# Patient Record
Sex: FEMALE | Race: WHITE
[De-identification: names, ages, dates, MRNs, and addresses within clinical notes are randomized per-mention and may not be internally consistent; named-entity substitution may affect disease eponyms.]

---

## 2018-03-28 ENCOUNTER — HOSPITAL ENCOUNTER (OUTPATIENT)
Age: 68
End: 2018-03-28
Payer: MEDICARE

## 2018-03-28 DIAGNOSIS — I65.21: Primary | ICD-10-CM

## 2018-03-28 PROCEDURE — 93882 EXTRACRANIAL UNI/LTD STUDY: CPT

## 2023-04-27 LAB
ALANINE AMINOTRANSFERASE (SGPT) (U/L) IN SER/PLAS: 27 U/L (ref 7–45)
ALBUMIN (G/DL) IN SER/PLAS: 4.4 G/DL (ref 3.4–5)
ALKALINE PHOSPHATASE (U/L) IN SER/PLAS: 110 U/L (ref 33–136)
ANION GAP IN SER/PLAS: 13 MMOL/L (ref 10–20)
ASPARTATE AMINOTRANSFERASE (SGOT) (U/L) IN SER/PLAS: 16 U/L (ref 9–39)
BASOPHILS (10*3/UL) IN BLOOD BY AUTOMATED COUNT: 0.02 X10E9/L (ref 0–0.1)
BASOPHILS/100 LEUKOCYTES IN BLOOD BY AUTOMATED COUNT: 0.2 % (ref 0–2)
BILIRUBIN TOTAL (MG/DL) IN SER/PLAS: 0.4 MG/DL (ref 0–1.2)
C REACTIVE PROTEIN (MG/L) IN SER/PLAS: 0.55 MG/DL
CALCIUM (MG/DL) IN SER/PLAS: 10.2 MG/DL (ref 8.6–10.6)
CARBON DIOXIDE, TOTAL (MMOL/L) IN SER/PLAS: 25 MMOL/L (ref 21–32)
CHLORIDE (MMOL/L) IN SER/PLAS: 108 MMOL/L (ref 98–107)
CREATININE (MG/DL) IN SER/PLAS: 0.68 MG/DL (ref 0.5–1.05)
EOSINOPHILS (10*3/UL) IN BLOOD BY AUTOMATED COUNT: 0 X10E9/L (ref 0–0.7)
EOSINOPHILS/100 LEUKOCYTES IN BLOOD BY AUTOMATED COUNT: 0 % (ref 0–6)
ERYTHROCYTE DISTRIBUTION WIDTH (RATIO) BY AUTOMATED COUNT: 14.3 % (ref 11.5–14.5)
ERYTHROCYTE MEAN CORPUSCULAR HEMOGLOBIN CONCENTRATION (G/DL) BY AUTOMATED: 32.4 G/DL (ref 32–36)
ERYTHROCYTE MEAN CORPUSCULAR VOLUME (FL) BY AUTOMATED COUNT: 89 FL (ref 80–100)
ERYTHROCYTES (10*6/UL) IN BLOOD BY AUTOMATED COUNT: 4.25 X10E12/L (ref 4–5.2)
GFR FEMALE: >90 ML/MIN/1.73M2
GLUCOSE (MG/DL) IN SER/PLAS: 179 MG/DL (ref 74–99)
HEMATOCRIT (%) IN BLOOD BY AUTOMATED COUNT: 38 % (ref 36–46)
HEMOGLOBIN (G/DL) IN BLOOD: 12.3 G/DL (ref 12–16)
IMMATURE GRANULOCYTES/100 LEUKOCYTES IN BLOOD BY AUTOMATED COUNT: 0.7 % (ref 0–0.9)
LEUKOCYTES (10*3/UL) IN BLOOD BY AUTOMATED COUNT: 12.9 X10E9/L (ref 4.4–11.3)
LYMPHOCYTES (10*3/UL) IN BLOOD BY AUTOMATED COUNT: 1.09 X10E9/L (ref 1.2–4.8)
LYMPHOCYTES/100 LEUKOCYTES IN BLOOD BY AUTOMATED COUNT: 8.4 % (ref 13–44)
MONOCYTES (10*3/UL) IN BLOOD BY AUTOMATED COUNT: 0.95 X10E9/L (ref 0.1–1)
MONOCYTES/100 LEUKOCYTES IN BLOOD BY AUTOMATED COUNT: 7.4 % (ref 2–10)
NEUTROPHILS (10*3/UL) IN BLOOD BY AUTOMATED COUNT: 10.77 X10E9/L (ref 1.2–7.7)
NEUTROPHILS/100 LEUKOCYTES IN BLOOD BY AUTOMATED COUNT: 83.3 % (ref 40–80)
NRBC (PER 100 WBCS) BY AUTOMATED COUNT: 0 /100 WBC (ref 0–0)
PLATELETS (10*3/UL) IN BLOOD AUTOMATED COUNT: 358 X10E9/L (ref 150–450)
POTASSIUM (MMOL/L) IN SER/PLAS: 4 MMOL/L (ref 3.5–5.3)
PROTEIN TOTAL: 7.3 G/DL (ref 6.4–8.2)
SEDIMENTATION RATE, ERYTHROCYTE: 33 MM/H (ref 0–30)
SODIUM (MMOL/L) IN SER/PLAS: 142 MMOL/L (ref 136–145)
UREA NITROGEN (MG/DL) IN SER/PLAS: 17 MG/DL (ref 6–23)

## 2023-10-22 PROBLEM — D84.821 IMMUNOSUPPRESSION DUE TO DRUG THERAPY (MULTI): Status: ACTIVE | Noted: 2023-10-22

## 2023-10-22 PROBLEM — E87.6 HYPOKALEMIA: Status: ACTIVE | Noted: 2023-10-22

## 2023-10-22 PROBLEM — Z79.899 IMMUNOSUPPRESSION DUE TO DRUG THERAPY (MULTI): Status: ACTIVE | Noted: 2023-10-22

## 2023-10-22 PROBLEM — E66.09 CLASS 2 OBESITY DUE TO EXCESS CALORIES WITH BODY MASS INDEX (BMI) OF 37.0 TO 37.9 IN ADULT: Status: ACTIVE | Noted: 2023-10-22

## 2023-10-22 PROBLEM — M48.061 LUMBAR SPINAL STENOSIS: Status: ACTIVE | Noted: 2023-10-22

## 2023-10-22 PROBLEM — M17.9 OSTEOARTHRITIS OF KNEE: Status: ACTIVE | Noted: 2023-10-22

## 2023-10-22 PROBLEM — I10 BENIGN ESSENTIAL HYPERTENSION: Status: ACTIVE | Noted: 2023-10-22

## 2023-10-22 PROBLEM — S63.599S: Status: ACTIVE | Noted: 2023-10-22

## 2023-10-22 PROBLEM — E78.2 MIXED HYPERLIPIDEMIA: Status: ACTIVE | Noted: 2023-10-22

## 2023-10-22 PROBLEM — L40.59 POLYARTICULAR PSORIATIC ARTHRITIS (MULTI): Status: ACTIVE | Noted: 2023-10-22

## 2023-10-22 PROBLEM — E66.812 CLASS 2 OBESITY DUE TO EXCESS CALORIES WITH BODY MASS INDEX (BMI) OF 37.0 TO 37.9 IN ADULT: Status: ACTIVE | Noted: 2023-10-22

## 2023-10-22 PROBLEM — E55.9 VITAMIN D DEFICIENCY: Status: ACTIVE | Noted: 2023-10-22

## 2023-10-22 PROBLEM — R91.1 PULMONARY NODULE: Status: ACTIVE | Noted: 2023-10-22

## 2023-10-22 PROBLEM — E11.9 TYPE 2 DIABETES MELLITUS, WITHOUT LONG-TERM CURRENT USE OF INSULIN (MULTI): Status: ACTIVE | Noted: 2023-10-22

## 2023-10-22 RX ORDER — BUDESONIDE 0.5 MG/2ML
0.5 INHALANT ORAL 2 TIMES DAILY
COMMUNITY

## 2023-10-22 RX ORDER — ACETAMINOPHEN 325 MG/1
325 TABLET ORAL EVERY 6 HOURS PRN
COMMUNITY

## 2023-10-22 RX ORDER — GABAPENTIN 100 MG/1
100 CAPSULE ORAL NIGHTLY
COMMUNITY
End: 2024-05-01 | Stop reason: WASHOUT

## 2023-10-22 RX ORDER — LOSARTAN POTASSIUM AND HYDROCHLOROTHIAZIDE 12.5; 5 MG/1; MG/1
TABLET ORAL
COMMUNITY
Start: 2018-10-15 | End: 2023-10-25

## 2023-10-22 RX ORDER — FLUOCINOLONE ACETONIDE 0.11 MG/ML
OIL TOPICAL 3 TIMES DAILY
COMMUNITY

## 2023-10-22 RX ORDER — POTASSIUM CHLORIDE 750 MG/1
1 TABLET, FILM COATED, EXTENDED RELEASE ORAL DAILY
COMMUNITY
Start: 2022-04-14

## 2023-10-22 RX ORDER — ALBUTEROL SULFATE 0.83 MG/ML
2.5 SOLUTION RESPIRATORY (INHALATION)
COMMUNITY

## 2023-10-22 RX ORDER — CHOLECALCIFEROL (VITAMIN D3) 1250 MCG
TABLET ORAL
COMMUNITY
Start: 2020-07-22

## 2023-10-22 RX ORDER — BACLOFEN 10 MG/1
TABLET ORAL 3 TIMES DAILY
COMMUNITY

## 2023-10-22 RX ORDER — PANTOPRAZOLE SODIUM 20 MG/1
1 TABLET, DELAYED RELEASE ORAL 2 TIMES DAILY
COMMUNITY
Start: 2020-07-22 | End: 2024-05-01 | Stop reason: WASHOUT

## 2023-10-22 RX ORDER — FLUTICASONE PROPIONATE 50 MCG
1 SPRAY, SUSPENSION (ML) NASAL DAILY
COMMUNITY

## 2023-10-22 RX ORDER — MINERAL OIL
180 ENEMA (ML) RECTAL DAILY
COMMUNITY
End: 2023-10-25 | Stop reason: ALTCHOICE

## 2023-10-22 RX ORDER — MELOXICAM 15 MG/1
TABLET ORAL
COMMUNITY
Start: 2020-07-22

## 2023-10-22 RX ORDER — NIFEDIPINE 20 MG/1
20 CAPSULE ORAL 3 TIMES DAILY
COMMUNITY
End: 2023-10-22 | Stop reason: ENTERED-IN-ERROR

## 2023-10-22 RX ORDER — APREMILAST 30 MG/1
1 TABLET, FILM COATED ORAL 2 TIMES DAILY
COMMUNITY
Start: 2020-07-22

## 2023-10-22 RX ORDER — SPIRONOLACTONE 25 MG/1
TABLET ORAL DAILY
COMMUNITY
End: 2024-05-01 | Stop reason: WASHOUT

## 2023-10-22 RX ORDER — SULFASALAZINE 500 MG/1
2 TABLET ORAL 2 TIMES DAILY
COMMUNITY
Start: 2020-07-22 | End: 2023-11-02 | Stop reason: SDDI

## 2023-10-22 RX ORDER — VITAMIN E
CREAM (GRAM) TOPICAL
COMMUNITY
Start: 2018-10-15

## 2023-10-22 RX ORDER — ALBUTEROL SULFATE 90 UG/1
2 AEROSOL, METERED RESPIRATORY (INHALATION) EVERY 4 HOURS PRN
COMMUNITY
Start: 2018-10-15

## 2023-10-22 RX ORDER — MONTELUKAST SODIUM 10 MG/1
1 TABLET ORAL DAILY
COMMUNITY
Start: 2021-12-29 | End: 2024-05-01 | Stop reason: WASHOUT

## 2023-10-22 RX ORDER — NIFEDIPINE 30 MG/1
30 TABLET, FILM COATED, EXTENDED RELEASE ORAL
COMMUNITY
End: 2024-05-01 | Stop reason: WASHOUT

## 2023-10-22 RX ORDER — NYSTATIN 100000 [USP'U]/G
POWDER TOPICAL
COMMUNITY
Start: 2018-10-15

## 2023-10-22 RX ORDER — CLOBETASOL PROPIONATE 0.5 MG/G
OINTMENT TOPICAL 2 TIMES DAILY
COMMUNITY

## 2023-10-22 RX ORDER — EMOLLIENT COMBINATION NO.53
CREAM (GRAM) TOPICAL
COMMUNITY
Start: 2018-10-15 | End: 2023-10-25

## 2023-10-22 RX ORDER — IBUPROFEN 200 MG
TABLET ORAL EVERY 6 HOURS PRN
COMMUNITY

## 2023-10-22 NOTE — PROGRESS NOTES
"Subjective   Patient ID: Dianna Hernandez is a 73 y.o. female who presents for Arthritis.  HPI  Since last seen, pt reports she had a rough summer.  Currently getting evaluated for episodes of syncope.   Plans to see electrophysiologist and have tilt table test.   Has been having very high blood pressures and is worried.  Has  a call in to her cardiologist for more direction   Having dental issues and just had an extraction last week  Has been having a lot of upper back pain.  Had xray and it showed arthritis in neck and upper back  Part of evaluation of syncope led to more neuro evaluation.  MRI last year showed microvascular changes.  EEG now shows \"electrical dysfunction in frontal and temporal lobe\"  Pt scheduled to see neuro for a brain health evaluation  Otezla held for 2 weeks for nausea.   Arthritis flared  Now back on it.  Hard to tell if patient has been consistent with sulfasalazine use  Patient Active Problem List    Diagnosis Date Noted    Fibromyalgia 10/25/2023    Psoriasis 10/25/2023    Benign essential hypertension 10/22/2023    Hypokalemia 10/22/2023    Lumbar spinal stenosis 10/22/2023    Mixed hyperlipidemia 10/22/2023    Tear of triangular fibrocartilage complex (TFCC), sequela 10/22/2023    Polyarticular psoriatic arthritis (CMS/HCC) 10/22/2023    Vitamin D deficiency 10/22/2023    Osteoarthritis of knee 10/22/2023    Class 2 obesity due to excess calories with body mass index (BMI) of 35.0 to 35.9 in adult 10/22/2023    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) 10/22/2023    Type 2 diabetes mellitus, without long-term current use of insulin (CMS/HCC) 10/22/2023    Pulmonary nodule 10/22/2023     Past Medical History:   Diagnosis Date    Anal polyp     Anorectal polyp    Anemia     History of anemia    Asthma     History of asthma    Benign neoplasm     History of other benign neoplasm    Biliary dyskinesia     Biliary dyskinesia    Cataract     History of cataract    Diffuse esophageal " spasm     Diverticulosis     Diverticulosis of intestine, part unspecified, without perforation or abscess without bleeding     Diverticulosis    Dry eye syndrome of unspecified lacrimal gland     Dry eye syndrome    Dyskinesia of esophagus     Diffuse esophageal spasm    Frontotemporal brain disease (CMS/HCC) 2023    Last Assessment & Plan: Formatting of this note might be different from the original. Not scheduled with brain health neurologist until November.  Is concerned because alone and doesn't trust brother and sister.  No one wants to be poa for healthcare, because brother filed a law suit after mother .    Gastric ulcer     History of gastric ulcer    GERD (gastroesophageal reflux disease) 2021    History of gastroesophageal reflux (GERD)    Herniated cervical disc without myelopathy     Herniated cervical disc without myelopathy    Herniated thoracic disc without myelopathy     Herniated thoracic disc without myelopathy    Hiatal hernia     History of hiatal hernia    IBS (irritable bowel syndrome)     History of irritable bowel syndrome    Major depressive disorder, recurrent, moderate (CMS/HCC)     Moderate episode of recurrent major depressive disorder    Malignant neoplasm (CMS/HCC)     History of malignant neoplasm    Myopia     History of myopia    Neuropathy     History of neuropathy    Obstructive sleep apnea (adult) (pediatric)     ALBINA on CPAP    Ovarian cyst     History of ovarian cyst    Personal history of pneumonia (recurrent)     History of pneumonia    Posterior vitreous detachment     Posterior vitreous detachment    Vitreous floaters     Vitreous floaters     Current Outpatient Medications   Medication Instructions    acetaminophen (TYLENOL) 325 mg, oral, Every 6 hours PRN    albuterol (ProAir HFA) 90 mcg/actuation inhaler 2 puffs, inhalation, Every 4 hours PRN    albuterol 2.5 mg, nebulization    apremilast (Otezla) 30 mg tablet 1 tablet, oral, 2 times daily    baclofen  (Lioresal) 10 mg tablet oral, 3 times daily    budesonide (PULMICORT) 0.5 mg, nebulization, 2 times daily, Rinse mouth with water after use to reduce aftertaste and incidence of candidiasis. Do not swallow.    carboxymethylcellulose-glycern 1-0.9 % drops,gel ophthalmic (eye)    cholecalciferol (Vitamin D3) 1,250 mcg (50,000 unit) tablet oral    clobetasol (Temovate) 0.05 % ointment Topical, 2 times daily    econazole nitrate 1 % cream Topical, 2 times daily    fluocinolone (Derma-Smoothe) 0.01 % external oil Topical, 3 times daily    fluticasone (Flonase) 50 mcg/actuation nasal spray 1 spray, Each Nostril, Daily, Shake gently. Before first use, prime pump. After use, clean tip and replace cap.    gabapentin (NEURONTIN) 100 mg, oral, Nightly    ibuprofen 200 mg tablet oral, Every 6 hours PRN    losartan (COZAAR) 50 mg, oral, Daily    meloxicam (Mobic) 15 mg tablet oral    montelukast (Singulair) 10 mg tablet 1 tablet, oral, Daily    NIFEdipine ER (ADALAT CC) 30 mg, oral, Daily before breakfast, Do not crush, chew, or split.    nystatin (Nyamyc) 100,000 unit/gram powder Nyamyc 152147 UNIT/GM External Powder   Refills: 0        Start : 15-Oct-2018   Active    pantoprazole (ProtoNix) 20 mg EC tablet 1 tablet, oral, 2 times daily    potassium chloride CR (Klor-Con 10) 10 mEq ER tablet 1 tablet, oral, Daily    spironolactone (Aldactone) 25 mg tablet oral, Daily    sulfaSALAzine (Azulfidine) 500 mg tablet 2 tablets, oral, 2 times daily    vitamin E cream Vitamin E 100 UNIT/GM CREA   Refills: 0        Start : 15-Oct-2018   Active     Allergies   Allergen Reactions    Nickel Other and Rash    Aspartame Other     Other reaction(s): Other: See Comments   Muscle spasms    Muscle spasms    Egg Other and Diarrhea    Adhesive Tape-Silicones Unknown    American Cockroach Allergenic Extract Other     Other reaction(s): Intolerance    Celecoxib Other     Makes asthma worse    Celery Other    Histamine Other    House Dust Mite Other  "   Lobster Unknown    Mold Unknown and Other     Other reaction(s): Intolerance    Nifedipine Other     Taken off by cardiology due to foot and ankle swelling    Other Unknown     Aspartame, Nutrasweet, Equal    Pecan Nut Itching and Other     Other reaction(s): Intolerance    Rofecoxib Itching    Strawberry Unknown    Wheat Other    Yeast Other       Review of Systems   Constitutional:  Positive for fatigue. Negative for fever and unexpected weight change.   HENT:  Negative for congestion.    Respiratory:  Negative for cough, chest tightness and shortness of breath.    Cardiovascular:  Negative for chest pain, palpitations and leg swelling.   Musculoskeletal:  Positive for arthralgias, back pain, gait problem, joint swelling and neck pain.   Skin:  Negative for color change and rash.   Neurological:  Positive for syncope and weakness. Negative for dizziness, numbness and headaches.   Hematological:  Does not bruise/bleed easily.       Objective  /72   Pulse 72   Temp 36.3 °C (97.3 °F)   Ht 1.638 m (5' 4.5\")   Wt 94.3 kg (208 lb)   BMI 35.15 kg/m²     Physical Exam  Vitals reviewed.   Constitutional:       General: She is not in acute distress.     Appearance: Normal appearance.   HENT:      Head: Normocephalic and atraumatic.   Eyes:      Conjunctiva/sclera: Conjunctivae normal.   Pulmonary:      Effort: Pulmonary effort is normal.      Breath sounds: No wheezing or rales.   Musculoskeletal:         General: No swelling, tenderness or deformity.      Right lower leg: No edema.      Left lower leg: No edema.   Skin:     Findings: No erythema or rash.   Neurological:      General: No focal deficit present.      Mental Status: She is alert.   Psychiatric:         Mood and Affect: Mood normal.           Assessment/Plan   Problem List Items Addressed This Visit             ICD-10-CM    Lumbar spinal stenosis M48.061    Polyarticular psoriatic arthritis (CMS/HCC) - Primary L40.59    Relevant Orders    Follow " Up In Rheumatology    CBC and Auto Differential    Comprehensive Metabolic Panel    C-Reactive Protein    Sedimentation Rate    Immunosuppression due to drug therapy (CMS/[facility]) D84.821, Z79.899    Type 2 diabetes mellitus, without long-term current use of insulin (CMS/[facility]) E11.9     Managed by PCP         Fibromyalgia M79.7        Patient was identified as a fall risk. Risk prevention instructions provided.

## 2023-10-23 RX ORDER — TRAMADOL HYDROCHLORIDE 50 MG/1
TABLET ORAL
COMMUNITY
End: 2023-10-25 | Stop reason: ALTCHOICE

## 2023-10-25 ENCOUNTER — OFFICE VISIT (OUTPATIENT)
Dept: RHEUMATOLOGY | Facility: CLINIC | Age: 73
End: 2023-10-25
Payer: MEDICARE

## 2023-10-25 ENCOUNTER — LAB (OUTPATIENT)
Dept: LAB | Facility: LAB | Age: 73
End: 2023-10-25
Payer: MEDICARE

## 2023-10-25 VITALS
WEIGHT: 208 LBS | SYSTOLIC BLOOD PRESSURE: 143 MMHG | BODY MASS INDEX: 34.66 KG/M2 | TEMPERATURE: 97.3 F | HEIGHT: 65 IN | HEART RATE: 72 BPM | DIASTOLIC BLOOD PRESSURE: 72 MMHG

## 2023-10-25 DIAGNOSIS — L40.59 POLYARTICULAR PSORIATIC ARTHRITIS (MULTI): ICD-10-CM

## 2023-10-25 DIAGNOSIS — D84.821 IMMUNOSUPPRESSION DUE TO DRUG THERAPY (MULTI): ICD-10-CM

## 2023-10-25 DIAGNOSIS — E11.9 TYPE 2 DIABETES MELLITUS WITHOUT COMPLICATION, WITHOUT LONG-TERM CURRENT USE OF INSULIN (MULTI): ICD-10-CM

## 2023-10-25 DIAGNOSIS — Z79.899 IMMUNOSUPPRESSION DUE TO DRUG THERAPY (MULTI): ICD-10-CM

## 2023-10-25 DIAGNOSIS — M79.7 FIBROMYALGIA: ICD-10-CM

## 2023-10-25 DIAGNOSIS — M48.061 SPINAL STENOSIS OF LUMBAR REGION WITHOUT NEUROGENIC CLAUDICATION: ICD-10-CM

## 2023-10-25 DIAGNOSIS — I10 BENIGN ESSENTIAL HYPERTENSION: ICD-10-CM

## 2023-10-25 DIAGNOSIS — L40.59 POLYARTICULAR PSORIATIC ARTHRITIS (MULTI): Primary | ICD-10-CM

## 2023-10-25 PROBLEM — L40.9 PSORIASIS: Status: ACTIVE | Noted: 2023-10-25

## 2023-10-25 PROBLEM — F02.80: Status: RESOLVED | Noted: 2023-06-27 | Resolved: 2023-10-25

## 2023-10-25 PROBLEM — G31.09: Status: RESOLVED | Noted: 2023-06-27 | Resolved: 2023-10-25

## 2023-10-25 LAB
ALBUMIN SERPL BCP-MCNC: 4.2 G/DL (ref 3.4–5)
ALP SERPL-CCNC: 108 U/L (ref 33–136)
ALT SERPL W P-5'-P-CCNC: 29 U/L (ref 7–45)
ANION GAP SERPL CALC-SCNC: 16 MMOL/L (ref 10–20)
AST SERPL W P-5'-P-CCNC: 18 U/L (ref 9–39)
BASOPHILS # BLD AUTO: 0.07 X10*3/UL (ref 0–0.1)
BASOPHILS NFR BLD AUTO: 0.7 %
BILIRUB SERPL-MCNC: 0.4 MG/DL (ref 0–1.2)
BUN SERPL-MCNC: 21 MG/DL (ref 6–23)
CALCIUM SERPL-MCNC: 9.8 MG/DL (ref 8.6–10.6)
CHLORIDE SERPL-SCNC: 107 MMOL/L (ref 98–107)
CO2 SERPL-SCNC: 24 MMOL/L (ref 21–32)
CREAT SERPL-MCNC: 0.65 MG/DL (ref 0.5–1.05)
CRP SERPL-MCNC: 1.32 MG/DL
EOSINOPHIL # BLD AUTO: 0.01 X10*3/UL (ref 0–0.4)
EOSINOPHIL NFR BLD AUTO: 0.1 %
ERYTHROCYTE [DISTWIDTH] IN BLOOD BY AUTOMATED COUNT: 13.9 % (ref 11.5–14.5)
ERYTHROCYTE [SEDIMENTATION RATE] IN BLOOD BY WESTERGREN METHOD: 33 MM/H (ref 0–30)
GFR SERPL CREATININE-BSD FRML MDRD: >90 ML/MIN/1.73M*2
GLUCOSE SERPL-MCNC: 145 MG/DL (ref 74–99)
HCT VFR BLD AUTO: 42 % (ref 36–46)
HGB BLD-MCNC: 13.2 G/DL (ref 12–16)
IMM GRANULOCYTES # BLD AUTO: 0.02 X10*3/UL (ref 0–0.5)
IMM GRANULOCYTES NFR BLD AUTO: 0.2 % (ref 0–0.9)
LYMPHOCYTES # BLD AUTO: 1.86 X10*3/UL (ref 0.8–3)
LYMPHOCYTES NFR BLD AUTO: 17.6 %
MCH RBC QN AUTO: 28.7 PG (ref 26–34)
MCHC RBC AUTO-ENTMCNC: 31.4 G/DL (ref 32–36)
MCV RBC AUTO: 91 FL (ref 80–100)
MONOCYTES # BLD AUTO: 0.83 X10*3/UL (ref 0.05–0.8)
MONOCYTES NFR BLD AUTO: 7.9 %
NEUTROPHILS # BLD AUTO: 7.75 X10*3/UL (ref 1.6–5.5)
NEUTROPHILS NFR BLD AUTO: 73.5 %
NRBC BLD-RTO: 0 /100 WBCS (ref 0–0)
PLATELET # BLD AUTO: 336 X10*3/UL (ref 150–450)
PMV BLD AUTO: 11 FL (ref 7.5–11.5)
POTASSIUM SERPL-SCNC: 4.2 MMOL/L (ref 3.5–5.3)
PROT SERPL-MCNC: 7.2 G/DL (ref 6.4–8.2)
RBC # BLD AUTO: 4.6 X10*6/UL (ref 4–5.2)
SODIUM SERPL-SCNC: 143 MMOL/L (ref 136–145)
WBC # BLD AUTO: 10.5 X10*3/UL (ref 4.4–11.3)

## 2023-10-25 PROCEDURE — 85652 RBC SED RATE AUTOMATED: CPT

## 2023-10-25 PROCEDURE — 1159F MED LIST DOCD IN RCRD: CPT | Performed by: INTERNAL MEDICINE

## 2023-10-25 PROCEDURE — 80053 COMPREHEN METABOLIC PANEL: CPT

## 2023-10-25 PROCEDURE — 86140 C-REACTIVE PROTEIN: CPT

## 2023-10-25 PROCEDURE — 85025 COMPLETE CBC W/AUTO DIFF WBC: CPT

## 2023-10-25 PROCEDURE — 4010F ACE/ARB THERAPY RXD/TAKEN: CPT | Performed by: INTERNAL MEDICINE

## 2023-10-25 PROCEDURE — 3078F DIAST BP <80 MM HG: CPT | Performed by: INTERNAL MEDICINE

## 2023-10-25 PROCEDURE — 99214 OFFICE O/P EST MOD 30 MIN: CPT | Performed by: INTERNAL MEDICINE

## 2023-10-25 PROCEDURE — 1125F AMNT PAIN NOTED PAIN PRSNT: CPT | Performed by: INTERNAL MEDICINE

## 2023-10-25 PROCEDURE — 3077F SYST BP >= 140 MM HG: CPT | Performed by: INTERNAL MEDICINE

## 2023-10-25 PROCEDURE — 36415 COLL VENOUS BLD VENIPUNCTURE: CPT

## 2023-10-25 RX ORDER — ECONAZOLE NITRATE 10 MG/G
CREAM TOPICAL 2 TIMES DAILY
COMMUNITY
Start: 2019-10-14

## 2023-10-25 RX ORDER — LOSARTAN POTASSIUM 50 MG/1
75 TABLET ORAL DAILY
COMMUNITY

## 2023-10-25 ASSESSMENT — ENCOUNTER SYMPTOMS
BRUISES/BLEEDS EASILY: 0
FEVER: 0
FATIGUE: 1
COLOR CHANGE: 0
BACK PAIN: 1
CHEST TIGHTNESS: 0
DIZZINESS: 0
JOINT SWELLING: 1
COUGH: 0
SHORTNESS OF BREATH: 0
WEAKNESS: 1
HEADACHES: 0
UNEXPECTED WEIGHT CHANGE: 0
ARTHRALGIAS: 1
PALPITATIONS: 0
NECK PAIN: 1
NUMBNESS: 0

## 2023-10-25 ASSESSMENT — JOINT PAIN
TOTAL NUMBER OF TENDER JOINTS: 4
TOTAL NUMBER OF SWOLLEN JOINTS: 2

## 2023-10-25 ASSESSMENT — PATIENT HEALTH QUESTIONNAIRE - PHQ9
SUM OF ALL RESPONSES TO PHQ9 QUESTIONS 1 AND 2: 0
1. LITTLE INTEREST OR PLEASURE IN DOING THINGS: NOT AT ALL
2. FEELING DOWN, DEPRESSED OR HOPELESS: NOT AT ALL

## 2023-10-25 NOTE — ASSESSMENT & PLAN NOTE
Psoriatic arthritis on otezla and intermittent use of sulfasalazine.  Labs ordered to assess inflammation control

## 2023-10-25 NOTE — LETTER
"October 25, 2023     Zeny Parker MD  [address]  [place] [address]    Patient: Dianna Hernandez   YOB: 1950   Date of Visit: 10/25/2023       Dear Dr. Zeny Parker MD:    Thank you for referring Dianna Hernandez to me for evaluation. Below are my notes for this consultation.  If you have questions, please do not hesitate to call me. I look forward to following your patient along with you.       Sincerely,     Huma Ba MD      CC: No Recipients  ______________________________________________________________________________________    Subjective  Patient ID: Dianna Hernandez is a 73 y.o. female who presents for Arthritis.  HPI  Since last seen, pt reports she had a rough summer.  Currently getting evaluated for episodes of syncope.   Plans to see electrophysiologist and have tilt table test.   Has been having very high blood pressures and is worried.  Has  a call in to her cardiologist for more direction   Having dental issues and just had an extraction last week  Has been having a lot of upper back pain.  Had xray and it showed arthritis in neck and upper back  Part of evaluation of syncope led to more neuro evaluation.  MRI last year showed microvascular changes.  EEG now shows \"electrical dysfunction in frontal and temporal lobe\"  Pt scheduled to see neuro for a brain health evaluation  Otezla held for 2 weeks for nausea.   Arthritis flared  Now back on it.  Hard to tell if patient has been consistent with sulfasalazine use  Patient Active Problem List    Diagnosis Date Noted   • Fibromyalgia 10/25/2023   • Psoriasis 10/25/2023   • Benign essential hypertension 10/22/2023   • Hypokalemia 10/22/2023   • Lumbar spinal stenosis 10/22/2023   • Mixed hyperlipidemia 10/22/2023   • Tear of triangular fibrocartilage complex (TFCC), sequela 10/22/2023   • Polyarticular psoriatic arthritis (CMS/HCC) 10/22/2023   • Vitamin D deficiency 10/22/2023   • Osteoarthritis of knee 10/22/2023   • Class " 2 obesity due to excess calories with body mass index (BMI) of 35.0 to 35.9 in adult 10/22/2023   • Immunosuppression due to drug therapy (CMS/[facility]) 10/22/2023   • Type 2 diabetes mellitus, without long-term current use of insulin (CMS/[facility] [place] [facility]) 10/22/2023   • Pulmonary nodule 10/22/2023     Past Medical History:   Diagnosis Date   • Anal polyp     Anorectal polyp   • Anemia     History of anemia   • Asthma     History of asthma   • Benign neoplasm     History of other benign neoplasm   • Biliary dyskinesia     Biliary dyskinesia   • Cataract     History of cataract   • Diffuse esophageal spasm    • Diverticulosis    • Diverticulosis of intestine, part unspecified, without perforation or abscess without bleeding     Diverticulosis   • Dry eye syndrome of unspecified lacrimal gland     Dry eye syndrome   • Dyskinesia of esophagus     Diffuse esophageal spasm   • Frontotemporal brain disease (CMS/[facility]) 2023    Last Assessment & Plan: Formatting of this note might be different from the original. Not scheduled with brain health neurologist until November.  Is concerned because alone and doesn't trust brother and sister.  No one wants to be poa for healthcare, because brother filed a law suit after mother .   • Gastric ulcer     History of gastric ulcer   • GERD (gastroesophageal reflux disease) 2021    History of gastroesophageal reflux (GERD)   • Herniated cervical disc without myelopathy     Herniated cervical disc without myelopathy   • Herniated thoracic disc without myelopathy     Herniated thoracic disc without myelopathy   • Hiatal hernia     History of hiatal hernia   • IBS (irritable bowel syndrome)     History of irritable bowel syndrome   • Major depressive disorder, recurrent, moderate (CMS/[facility])     Moderate episode of recurrent major depressive disorder   • Malignant neoplasm (CMS/[facility])     History of malignant neoplasm   • Myopia     History of myopia   • Neuropathy     History of neuropathy   •  Obstructive sleep apnea (adult) (pediatric)     ALBINA on CPAP   • Ovarian cyst     History of ovarian cyst   • Personal history of pneumonia (recurrent)     History of pneumonia   • Posterior vitreous detachment     Posterior vitreous detachment   • Vitreous floaters     Vitreous floaters     Current Outpatient Medications   Medication Instructions   • acetaminophen (TYLENOL) 325 mg, oral, Every 6 hours PRN   • albuterol (ProAir HFA) 90 mcg/actuation inhaler 2 puffs, inhalation, Every 4 hours PRN   • albuterol 2.5 mg, nebulization   • apremilast (Otezla) 30 mg tablet 1 tablet, oral, 2 times daily   • baclofen (Lioresal) 10 mg tablet oral, 3 times daily   • budesonide (PULMICORT) 0.5 mg, nebulization, 2 times daily, Rinse mouth with water after use to reduce aftertaste and incidence of candidiasis. Do not swallow.   • carboxymethylcellulose-glycern 1-0.9 % drops,gel ophthalmic (eye)   • cholecalciferol (Vitamin D3) 1,250 mcg (50,000 unit) tablet oral   • clobetasol (Temovate) 0.05 % ointment Topical, 2 times daily   • econazole nitrate 1 % cream Topical, 2 times daily   • fluocinolone (Derma-Smoothe) 0.01 % external oil Topical, 3 times daily   • fluticasone (Flonase) 50 mcg/actuation nasal spray 1 spray, Each Nostril, Daily, Shake gently. Before first use, prime pump. After use, clean tip and replace cap.   • gabapentin (NEURONTIN) 100 mg, oral, Nightly   • ibuprofen 200 mg tablet oral, Every 6 hours PRN   • losartan (COZAAR) 50 mg, oral, Daily   • meloxicam (Mobic) 15 mg tablet oral   • montelukast (Singulair) 10 mg tablet 1 tablet, oral, Daily   • NIFEdipine ER (ADALAT CC) 30 mg, oral, Daily before breakfast, Do not crush, chew, or split.   • nystatin (Nyamyc) 100,000 unit/gram powder Nyamyc 332651 UNIT/GM External Powder   Refills: 0        Start : 15-Oct-2018   Active   • pantoprazole (ProtoNix) 20 mg EC tablet 1 tablet, oral, 2 times daily   • potassium chloride CR (Klor-Con 10) 10 mEq ER tablet 1 tablet, oral,  "Daily   • spironolactone (Aldactone) 25 mg tablet oral, Daily   • sulfaSALAzine (Azulfidine) 500 mg tablet 2 tablets, oral, 2 times daily   • vitamin E cream Vitamin E 100 UNIT/GM CREA   Refills: 0        Start : 15-Oct-2018   Active     Allergies   Allergen Reactions   • Nickel Other and Rash   • Aspartame Other     Other reaction(s): Other: See Comments   Muscle spasms    Muscle spasms   • Egg Other and Diarrhea   • Adhesive Tape-Silicones Unknown   • American Cockroach Allergenic Extract Other     Other reaction(s): Intolerance   • Celecoxib Other     Makes asthma worse   • Celery Other   • Histamine Other   • House Dust Mite Other   • Lobster Unknown   • Mold Unknown and Other     Other reaction(s): Intolerance   • Nifedipine Other     Taken off by cardiology due to foot and ankle swelling   • Other Unknown     Aspartame, Nutrasweet, Equal   • Pecan Nut Itching and Other     Other reaction(s): Intolerance   • Rofecoxib Itching   • Strawberry Unknown   • Wheat Other   • Yeast Other       Review of Systems   Constitutional:  Positive for fatigue. Negative for fever and unexpected weight change.   HENT:  Negative for congestion.    Respiratory:  Negative for cough, chest tightness and shortness of breath.    Cardiovascular:  Negative for chest pain, palpitations and leg swelling.   Musculoskeletal:  Positive for arthralgias, back pain, gait problem, joint swelling and neck pain.   Skin:  Negative for color change and rash.   Neurological:  Positive for syncope and weakness. Negative for dizziness, numbness and headaches.   Hematological:  Does not bruise/bleed easily.       Objective /72   Pulse 72   Temp 36.3 °C (97.3 °F)   Ht 1.638 m (5' 4.5\")   Wt 94.3 kg (208 lb)   BMI 35.15 kg/m²     Physical Exam  Vitals reviewed.   Constitutional:       General: She is not in acute distress.     Appearance: Normal appearance.   HENT:      Head: Normocephalic and atraumatic.   Eyes:      Conjunctiva/sclera: " Conjunctivae normal.   Pulmonary:      Effort: Pulmonary effort is normal.      Breath sounds: No wheezing or rales.   Musculoskeletal:         General: No swelling, tenderness or deformity.      Right lower leg: No edema.      Left lower leg: No edema.   Skin:     Findings: No erythema or rash.   Neurological:      General: No focal deficit present.      Mental Status: She is alert.   Psychiatric:         Mood and Affect: Mood normal.           Assessment/Plan  Problem List Items Addressed This Visit             ICD-10-CM    Lumbar spinal stenosis M48.061    Polyarticular psoriatic arthritis (CMS/HCC) - Primary L40.59    Relevant Orders    Follow Up In Rheumatology    CBC and Auto Differential    Comprehensive Metabolic Panel    C-Reactive Protein    Sedimentation Rate    Immunosuppression due to drug therapy (CMS/[facility]) D84.821, Z79.899    Type 2 diabetes mellitus, without long-term current use of insulin (CMS/[facility]) E11.9     Managed by PCP         Fibromyalgia M79.7        Patient was identified as a fall risk. Risk prevention instructions provided.

## 2023-10-25 NOTE — PATIENT INSTRUCTIONS
It was a pleasure to see you today  Please call if your symptoms worsen  Please review your summary for education and reminders.  Follow up at your next appointment.    If you had labs/xrays done today, you will be able to view on [employer].   We will contact you when the results are reviewed for further discussion.  Please note that you may receive your results before I have had a chance to review.  Please know I will be contacting you for discussion  Homegoing instructions for all patient  A healthy lifestyle helps chronic diseases  These are all the goals you should strive to improve your overall health   Blood pressure <130/85   BMI of <30 or waist circumference that is 1/2 of your height   Fasting blood sugar <107 (if you are diabetic, aim for an A1c <6.4%_   LDL cholesterol <130   Avoid smoking   Manage your stress   Get your preventive exams   Get your immunizations        Ways to Help Prevent Falls at Home    Quick Tips   ? Ask for help if you need it. Most people want to help!   ? Get up slowly after sitting or laying down   ? Wear a medical alert device or keep cell phone in your pocket   ? Use night lights, especially areas near a bathroom   ? Keep the items you use often within reach on a small stool or end table   ? Use an assistive device such as walker or cane, as directed by provider/physical therapy   ? Use a non-slip mat and grab bars in your bathroom. Look for home health sections for best options     Other Areas to Focus On   ? Exercise and nutrition: Regular exercise or taking a falls prevention class are great ways improve strength and balance. Don’t forget to stay hydrated and bring a snack!   ? Medicine side effects: Some medicines can make you sleepy or dizzy, which could cause a fall. Ask your healthcare provider about the side effects your medicines could cause. Be sure to let them know if you take any vitamins or supplements as well.   ? Tripping hazards: Remove items you could trip on,  such as loose mats, rugs, cords, and clutter. Wear closed toe shoes with rubber soles.   ? Health and wellness: Get regular checkups with your healthcare provider, plus routine vision and hearing screenings. Talk with your healthcare provider about:   o Your medicines and the possible side effects - bring them in a bag if that is easier!   o Problems with balance or feeling dizzy   o Ways to promote bone health, such as Vitamin D and calcium supplements   o Questions or concerns about falling     *Ask your healthcare team if you have questions     ©[facility], 2022

## 2023-11-01 ENCOUNTER — OFFICE (OUTPATIENT)
Dept: URBAN - METROPOLITAN AREA CLINIC 27 | Facility: CLINIC | Age: 73
End: 2023-11-01
Payer: MEDICARE

## 2023-11-01 VITALS
HEIGHT: 64 IN | TEMPERATURE: 98.2 F | WEIGHT: 208 LBS | DIASTOLIC BLOOD PRESSURE: 66 MMHG | SYSTOLIC BLOOD PRESSURE: 143 MMHG | HEART RATE: 72 BPM

## 2023-11-01 DIAGNOSIS — Z80.0 FAMILY HISTORY OF MALIGNANT NEOPLASM OF DIGESTIVE ORGANS: ICD-10-CM

## 2023-11-01 DIAGNOSIS — R10.11 RIGHT UPPER QUADRANT PAIN: ICD-10-CM

## 2023-11-01 DIAGNOSIS — K21.9 GASTRO-ESOPHAGEAL REFLUX DISEASE WITHOUT ESOPHAGITIS: ICD-10-CM

## 2023-11-01 DIAGNOSIS — Z86.010 PERSONAL HISTORY OF COLONIC POLYPS: ICD-10-CM

## 2023-11-01 DIAGNOSIS — R11.2 NAUSEA WITH VOMITING, UNSPECIFIED: ICD-10-CM

## 2023-11-01 DIAGNOSIS — R13.10 DYSPHAGIA, UNSPECIFIED: ICD-10-CM

## 2023-11-01 PROCEDURE — 99204 OFFICE O/P NEW MOD 45 MIN: CPT | Performed by: INTERNAL MEDICINE

## 2023-11-02 ENCOUNTER — TELEPHONE (OUTPATIENT)
Dept: RHEUMATOLOGY | Facility: CLINIC | Age: 73
End: 2023-11-02
Payer: MEDICARE

## 2023-11-02 NOTE — TELEPHONE ENCOUNTER
Non urgent line @  Thank you for calling her today - appreciates you.  Do you think she needs to get a new dentist, see her pcp or schedule with an oral surgeon?  She's been dealing with this for 2 years , had 3 rounds of ATBX.  She wants your advice.    Pt# [phone] (ok to leave message if she does not answer.)

## 2023-11-02 NOTE — TELEPHONE ENCOUNTER
Called voice mail--responded to all questions  Abn labs due to sinus and tooth infections  Pt needs to take otzela twice daily since she is not taking sulfasalazine   She needs to talk to GI regarding timing of EGD

## 2023-11-02 NOTE — TELEPHONE ENCOUNTER
Patient called stating she has questions about her lab results.  Patient states page 1 Neutrophils 73.5 and Monocytes High.  Patient states she was only taking Otezla once a day to try to save due to the medication is expensive.  Patient states but Dr. Huerta said patient should take twice a day and is helping patient with samples.  Patient has been taking twice a day since seeing Dr. Ba.  Patient states she has been having trouble with chronic sinus infection, tooth infection spread to another tooth and had a tooth extracted.  Patient stopped the Sulfasalazine due to hair loss.  Patient told to her pharmacy and was told in rare cases there has been hair loss.  Patient's GI specialist wants patient to have a EGD patient asking if she should wait until knows infections are cleared up. Patient will not be home until approx. 3 pm and asked if Dr. Ba calls her today to call after 4 pm.  Patient phone [phone].

## 2023-11-02 NOTE — TELEPHONE ENCOUNTER
Called / spoke to pt  I read 's reply  She was thinking the same .. She will schedule with another dentist for a 2nd opinion.

## 2024-03-04 VITALS
SYSTOLIC BLOOD PRESSURE: 172 MMHG | DIASTOLIC BLOOD PRESSURE: 79 MMHG | SYSTOLIC BLOOD PRESSURE: 173 MMHG | DIASTOLIC BLOOD PRESSURE: 34 MMHG | OXYGEN SATURATION: 92 % | OXYGEN SATURATION: 94 % | WEIGHT: 205 LBS | DIASTOLIC BLOOD PRESSURE: 44 MMHG | RESPIRATION RATE: 12 BRPM | DIASTOLIC BLOOD PRESSURE: 34 MMHG | OXYGEN SATURATION: 93 % | DIASTOLIC BLOOD PRESSURE: 37 MMHG | HEART RATE: 61 BPM | HEART RATE: 58 BPM | HEART RATE: 68 BPM | SYSTOLIC BLOOD PRESSURE: 172 MMHG | DIASTOLIC BLOOD PRESSURE: 42 MMHG | HEART RATE: 61 BPM | HEART RATE: 66 BPM | HEART RATE: 67 BPM | DIASTOLIC BLOOD PRESSURE: 79 MMHG | RESPIRATION RATE: 11 BRPM | OXYGEN SATURATION: 96 % | DIASTOLIC BLOOD PRESSURE: 61 MMHG | RESPIRATION RATE: 11 BRPM | SYSTOLIC BLOOD PRESSURE: 158 MMHG | DIASTOLIC BLOOD PRESSURE: 74 MMHG | SYSTOLIC BLOOD PRESSURE: 111 MMHG | HEART RATE: 59 BPM | HEART RATE: 58 BPM | OXYGEN SATURATION: 90 % | OXYGEN SATURATION: 92 % | HEART RATE: 68 BPM | DIASTOLIC BLOOD PRESSURE: 74 MMHG | SYSTOLIC BLOOD PRESSURE: 101 MMHG | SYSTOLIC BLOOD PRESSURE: 116 MMHG | DIASTOLIC BLOOD PRESSURE: 45 MMHG | HEART RATE: 59 BPM | RESPIRATION RATE: 13 BRPM | DIASTOLIC BLOOD PRESSURE: 48 MMHG | DIASTOLIC BLOOD PRESSURE: 54 MMHG | RESPIRATION RATE: 13 BRPM | HEART RATE: 66 BPM | OXYGEN SATURATION: 98 % | SYSTOLIC BLOOD PRESSURE: 129 MMHG | DIASTOLIC BLOOD PRESSURE: 48 MMHG | OXYGEN SATURATION: 90 % | SYSTOLIC BLOOD PRESSURE: 101 MMHG | HEART RATE: 74 BPM | DIASTOLIC BLOOD PRESSURE: 42 MMHG | OXYGEN SATURATION: 96 % | OXYGEN SATURATION: 93 % | HEART RATE: 65 BPM | RESPIRATION RATE: 14 BRPM | RESPIRATION RATE: 15 BRPM | HEART RATE: 69 BPM | SYSTOLIC BLOOD PRESSURE: 162 MMHG | SYSTOLIC BLOOD PRESSURE: 118 MMHG | SYSTOLIC BLOOD PRESSURE: 137 MMHG | SYSTOLIC BLOOD PRESSURE: 110 MMHG | RESPIRATION RATE: 20 BRPM | SYSTOLIC BLOOD PRESSURE: 111 MMHG | DIASTOLIC BLOOD PRESSURE: 43 MMHG | RESPIRATION RATE: 12 BRPM | SYSTOLIC BLOOD PRESSURE: 118 MMHG | SYSTOLIC BLOOD PRESSURE: 158 MMHG | HEART RATE: 65 BPM | TEMPERATURE: 97.9 F | HEART RATE: 63 BPM | TEMPERATURE: 97.9 F | HEART RATE: 64 BPM | SYSTOLIC BLOOD PRESSURE: 110 MMHG | SYSTOLIC BLOOD PRESSURE: 129 MMHG | RESPIRATION RATE: 16 BRPM | RESPIRATION RATE: 14 BRPM | OXYGEN SATURATION: 97 % | OXYGEN SATURATION: 98 % | DIASTOLIC BLOOD PRESSURE: 61 MMHG | HEIGHT: 64 IN | RESPIRATION RATE: 20 BRPM | DIASTOLIC BLOOD PRESSURE: 43 MMHG | OXYGEN SATURATION: 97 % | OXYGEN SATURATION: 94 % | HEART RATE: 64 BPM | SYSTOLIC BLOOD PRESSURE: 116 MMHG | SYSTOLIC BLOOD PRESSURE: 173 MMHG | SYSTOLIC BLOOD PRESSURE: 137 MMHG | HEART RATE: 63 BPM | DIASTOLIC BLOOD PRESSURE: 37 MMHG | DIASTOLIC BLOOD PRESSURE: 44 MMHG | HEART RATE: 74 BPM | HEIGHT: 64 IN | WEIGHT: 205 LBS | DIASTOLIC BLOOD PRESSURE: 54 MMHG | HEART RATE: 69 BPM | HEART RATE: 67 BPM | DIASTOLIC BLOOD PRESSURE: 47 MMHG | RESPIRATION RATE: 15 BRPM | RESPIRATION RATE: 16 BRPM | SYSTOLIC BLOOD PRESSURE: 123 MMHG | DIASTOLIC BLOOD PRESSURE: 45 MMHG | DIASTOLIC BLOOD PRESSURE: 47 MMHG | SYSTOLIC BLOOD PRESSURE: 123 MMHG | SYSTOLIC BLOOD PRESSURE: 162 MMHG

## 2024-03-11 ENCOUNTER — AMBULATORY SURGICAL CENTER (OUTPATIENT)
Dept: URBAN - METROPOLITAN AREA SURGERY 12 | Facility: SURGERY | Age: 74
End: 2024-03-11
Payer: COMMERCIAL

## 2024-03-11 ENCOUNTER — OFFICE (OUTPATIENT)
Dept: URBAN - METROPOLITAN AREA PATHOLOGY 2 | Facility: PATHOLOGY | Age: 74
End: 2024-03-11
Payer: COMMERCIAL

## 2024-03-11 DIAGNOSIS — D12.7 BENIGN NEOPLASM OF RECTOSIGMOID JUNCTION: ICD-10-CM

## 2024-03-11 DIAGNOSIS — K64.4 RESIDUAL HEMORRHOIDAL SKIN TAGS: ICD-10-CM

## 2024-03-11 DIAGNOSIS — D12.3 BENIGN NEOPLASM OF TRANSVERSE COLON: ICD-10-CM

## 2024-03-11 DIAGNOSIS — Z80.0 FAMILY HISTORY OF MALIGNANT NEOPLASM OF DIGESTIVE ORGANS: ICD-10-CM

## 2024-03-11 DIAGNOSIS — Z12.11 ENCOUNTER FOR SCREENING FOR MALIGNANT NEOPLASM OF COLON: ICD-10-CM

## 2024-03-11 DIAGNOSIS — K63.89 OTHER SPECIFIED DISEASES OF INTESTINE: ICD-10-CM

## 2024-03-11 DIAGNOSIS — K64.8 OTHER HEMORRHOIDS: ICD-10-CM

## 2024-03-11 DIAGNOSIS — K57.30 DIVERTICULOSIS OF LARGE INTESTINE WITHOUT PERFORATION OR ABS: ICD-10-CM

## 2024-03-11 DIAGNOSIS — Z86.010 PERSONAL HISTORY OF COLONIC POLYPS: ICD-10-CM

## 2024-03-11 DIAGNOSIS — K63.5 POLYP OF COLON: ICD-10-CM

## 2024-03-11 DIAGNOSIS — Z09 ENCOUNTER FOR FOLLOW-UP EXAMINATION AFTER COMPLETED TREATMEN: ICD-10-CM

## 2024-03-11 PROCEDURE — 88305 TISSUE EXAM BY PATHOLOGIST: CPT | Mod: TC | Performed by: PATHOLOGY

## 2024-03-11 PROCEDURE — 45385 COLONOSCOPY W/LESION REMOVAL: CPT | Mod: 33 | Performed by: INTERNAL MEDICINE

## 2024-03-11 PROCEDURE — 88313 SPECIAL STAINS GROUP 2: CPT | Mod: TC | Performed by: PATHOLOGY

## 2024-03-11 PROCEDURE — 45380 COLONOSCOPY AND BIOPSY: CPT | Mod: 33,59 | Performed by: INTERNAL MEDICINE

## 2024-03-11 PROCEDURE — 88342 IMHCHEM/IMCYTCHM 1ST ANTB: CPT | Mod: TC | Performed by: PATHOLOGY

## 2024-03-27 VITALS
DIASTOLIC BLOOD PRESSURE: 65 MMHG | DIASTOLIC BLOOD PRESSURE: 65 MMHG | OXYGEN SATURATION: 93 % | RESPIRATION RATE: 17 BRPM | RESPIRATION RATE: 12 BRPM | SYSTOLIC BLOOD PRESSURE: 152 MMHG | HEART RATE: 78 BPM | OXYGEN SATURATION: 91 % | SYSTOLIC BLOOD PRESSURE: 127 MMHG | RESPIRATION RATE: 20 BRPM | RESPIRATION RATE: 16 BRPM | DIASTOLIC BLOOD PRESSURE: 55 MMHG | HEART RATE: 64 BPM | OXYGEN SATURATION: 95 % | RESPIRATION RATE: 15 BRPM | SYSTOLIC BLOOD PRESSURE: 140 MMHG | HEIGHT: 64 IN | HEART RATE: 66 BPM | HEART RATE: 64 BPM | RESPIRATION RATE: 12 BRPM | SYSTOLIC BLOOD PRESSURE: 127 MMHG | HEART RATE: 70 BPM | HEART RATE: 67 BPM | OXYGEN SATURATION: 96 % | HEART RATE: 70 BPM | HEART RATE: 67 BPM | TEMPERATURE: 98.2 F | HEART RATE: 68 BPM | WEIGHT: 207 LBS | DIASTOLIC BLOOD PRESSURE: 61 MMHG | HEART RATE: 79 BPM | OXYGEN SATURATION: 99 % | WEIGHT: 207 LBS | DIASTOLIC BLOOD PRESSURE: 95 MMHG | OXYGEN SATURATION: 95 % | SYSTOLIC BLOOD PRESSURE: 137 MMHG | HEIGHT: 64 IN | DIASTOLIC BLOOD PRESSURE: 59 MMHG | DIASTOLIC BLOOD PRESSURE: 60 MMHG | RESPIRATION RATE: 13 BRPM | DIASTOLIC BLOOD PRESSURE: 55 MMHG | SYSTOLIC BLOOD PRESSURE: 208 MMHG | TEMPERATURE: 98.2 F | DIASTOLIC BLOOD PRESSURE: 73 MMHG | RESPIRATION RATE: 17 BRPM | DIASTOLIC BLOOD PRESSURE: 95 MMHG | OXYGEN SATURATION: 91 % | SYSTOLIC BLOOD PRESSURE: 152 MMHG | HEART RATE: 69 BPM | RESPIRATION RATE: 9 BRPM | DIASTOLIC BLOOD PRESSURE: 95 MMHG | DIASTOLIC BLOOD PRESSURE: 60 MMHG | OXYGEN SATURATION: 93 % | RESPIRATION RATE: 14 BRPM | HEART RATE: 66 BPM | OXYGEN SATURATION: 99 % | SYSTOLIC BLOOD PRESSURE: 137 MMHG | RESPIRATION RATE: 21 BRPM | OXYGEN SATURATION: 95 % | RESPIRATION RATE: 15 BRPM | SYSTOLIC BLOOD PRESSURE: 153 MMHG | DIASTOLIC BLOOD PRESSURE: 49 MMHG | OXYGEN SATURATION: 93 % | DIASTOLIC BLOOD PRESSURE: 61 MMHG | RESPIRATION RATE: 12 BRPM | DIASTOLIC BLOOD PRESSURE: 49 MMHG | SYSTOLIC BLOOD PRESSURE: 136 MMHG | HEART RATE: 78 BPM | RESPIRATION RATE: 9 BRPM | SYSTOLIC BLOOD PRESSURE: 127 MMHG | HEART RATE: 64 BPM | HEART RATE: 68 BPM | SYSTOLIC BLOOD PRESSURE: 136 MMHG | RESPIRATION RATE: 15 BRPM | HEART RATE: 69 BPM | DIASTOLIC BLOOD PRESSURE: 59 MMHG | OXYGEN SATURATION: 91 % | HEART RATE: 68 BPM | HEART RATE: 66 BPM | HEART RATE: 78 BPM | DIASTOLIC BLOOD PRESSURE: 65 MMHG | HEART RATE: 79 BPM | SYSTOLIC BLOOD PRESSURE: 140 MMHG | SYSTOLIC BLOOD PRESSURE: 153 MMHG | DIASTOLIC BLOOD PRESSURE: 60 MMHG | RESPIRATION RATE: 16 BRPM | RESPIRATION RATE: 21 BRPM | DIASTOLIC BLOOD PRESSURE: 55 MMHG | SYSTOLIC BLOOD PRESSURE: 136 MMHG | SYSTOLIC BLOOD PRESSURE: 153 MMHG | RESPIRATION RATE: 13 BRPM | HEART RATE: 79 BPM | DIASTOLIC BLOOD PRESSURE: 61 MMHG | SYSTOLIC BLOOD PRESSURE: 140 MMHG | HEART RATE: 67 BPM | RESPIRATION RATE: 20 BRPM | DIASTOLIC BLOOD PRESSURE: 49 MMHG | RESPIRATION RATE: 14 BRPM | SYSTOLIC BLOOD PRESSURE: 137 MMHG | SYSTOLIC BLOOD PRESSURE: 208 MMHG | TEMPERATURE: 98.2 F | RESPIRATION RATE: 9 BRPM | RESPIRATION RATE: 17 BRPM | DIASTOLIC BLOOD PRESSURE: 73 MMHG | SYSTOLIC BLOOD PRESSURE: 152 MMHG | DIASTOLIC BLOOD PRESSURE: 73 MMHG | RESPIRATION RATE: 14 BRPM | HEART RATE: 70 BPM | RESPIRATION RATE: 20 BRPM | HEIGHT: 64 IN | OXYGEN SATURATION: 96 % | OXYGEN SATURATION: 96 % | DIASTOLIC BLOOD PRESSURE: 59 MMHG | RESPIRATION RATE: 16 BRPM | RESPIRATION RATE: 13 BRPM | WEIGHT: 207 LBS | SYSTOLIC BLOOD PRESSURE: 208 MMHG | OXYGEN SATURATION: 99 % | RESPIRATION RATE: 21 BRPM | HEART RATE: 69 BPM

## 2024-04-03 ENCOUNTER — AMBULATORY SURGICAL CENTER (OUTPATIENT)
Dept: URBAN - METROPOLITAN AREA SURGERY 12 | Facility: SURGERY | Age: 74
End: 2024-04-03
Payer: MEDICARE

## 2024-04-03 ENCOUNTER — AMBULATORY SURGICAL CENTER (OUTPATIENT)
Dept: URBAN - METROPOLITAN AREA SURGERY 12 | Facility: SURGERY | Age: 74
End: 2024-04-03
Payer: COMMERCIAL

## 2024-04-03 ENCOUNTER — OFFICE (OUTPATIENT)
Dept: URBAN - METROPOLITAN AREA PATHOLOGY 2 | Facility: PATHOLOGY | Age: 74
End: 2024-04-03
Payer: COMMERCIAL

## 2024-04-03 DIAGNOSIS — K29.50 UNSPECIFIED CHRONIC GASTRITIS WITHOUT BLEEDING: ICD-10-CM

## 2024-04-03 DIAGNOSIS — K44.9 DIAPHRAGMATIC HERNIA WITHOUT OBSTRUCTION OR GANGRENE: ICD-10-CM

## 2024-04-03 DIAGNOSIS — K22.2 ESOPHAGEAL OBSTRUCTION: ICD-10-CM

## 2024-04-03 DIAGNOSIS — K22.89 OTHER SPECIFIED DISEASE OF ESOPHAGUS: ICD-10-CM

## 2024-04-03 DIAGNOSIS — K21.9 GASTRO-ESOPHAGEAL REFLUX DISEASE WITHOUT ESOPHAGITIS: ICD-10-CM

## 2024-04-03 DIAGNOSIS — R11.2 NAUSEA WITH VOMITING, UNSPECIFIED: ICD-10-CM

## 2024-04-03 DIAGNOSIS — K31.7 POLYP OF STOMACH AND DUODENUM: ICD-10-CM

## 2024-04-03 DIAGNOSIS — K29.80 DUODENITIS WITHOUT BLEEDING: ICD-10-CM

## 2024-04-03 DIAGNOSIS — R13.10 DYSPHAGIA, UNSPECIFIED: ICD-10-CM

## 2024-04-03 DIAGNOSIS — R10.11 RIGHT UPPER QUADRANT PAIN: ICD-10-CM

## 2024-04-03 PROCEDURE — 43239 EGD BIOPSY SINGLE/MULTIPLE: CPT | Mod: 59 | Performed by: INTERNAL MEDICINE

## 2024-04-03 PROCEDURE — 43450 DILATE ESOPHAGUS 1/MULT PASS: CPT | Performed by: INTERNAL MEDICINE

## 2024-04-03 PROCEDURE — 88342 IMHCHEM/IMCYTCHM 1ST ANTB: CPT | Mod: TC | Performed by: PATHOLOGY

## 2024-04-03 PROCEDURE — 88305 TISSUE EXAM BY PATHOLOGIST: CPT | Mod: TC | Performed by: PATHOLOGY

## 2024-04-03 PROCEDURE — 43251 EGD REMOVE LESION SNARE: CPT | Performed by: INTERNAL MEDICINE

## 2024-04-03 PROCEDURE — 88313 SPECIAL STAINS GROUP 2: CPT | Mod: TC | Performed by: PATHOLOGY

## 2024-04-29 ENCOUNTER — OFFICE (OUTPATIENT)
Dept: URBAN - METROPOLITAN AREA CLINIC 27 | Facility: CLINIC | Age: 74
End: 2024-04-29
Payer: MEDICARE

## 2024-04-29 VITALS
SYSTOLIC BLOOD PRESSURE: 133 MMHG | WEIGHT: 210 LBS | HEART RATE: 82 BPM | HEIGHT: 64 IN | DIASTOLIC BLOOD PRESSURE: 79 MMHG

## 2024-04-29 DIAGNOSIS — Z80.0 FAMILY HISTORY OF MALIGNANT NEOPLASM OF DIGESTIVE ORGANS: ICD-10-CM

## 2024-04-29 DIAGNOSIS — Z86.010 PERSONAL HISTORY OF COLONIC POLYPS: ICD-10-CM

## 2024-04-29 DIAGNOSIS — K21.9 GASTRO-ESOPHAGEAL REFLUX DISEASE WITHOUT ESOPHAGITIS: ICD-10-CM

## 2024-04-29 DIAGNOSIS — Z09 ENCOUNTER FOR FOLLOW-UP EXAMINATION AFTER COMPLETED TREATMEN: ICD-10-CM

## 2024-04-29 DIAGNOSIS — K22.2 ESOPHAGEAL OBSTRUCTION: ICD-10-CM

## 2024-04-29 PROCEDURE — 99214 OFFICE O/P EST MOD 30 MIN: CPT | Performed by: INTERNAL MEDICINE

## 2024-04-29 RX ORDER — PAROXETINE 10 MG/1
10 TABLET, FILM COATED ORAL EVERY MORNING
COMMUNITY
Start: 2023-10-31

## 2024-05-01 ENCOUNTER — OFFICE VISIT (OUTPATIENT)
Dept: RHEUMATOLOGY | Facility: CLINIC | Age: 74
End: 2024-05-01
Payer: MEDICARE

## 2024-05-01 VITALS
HEART RATE: 62 BPM | HEIGHT: 65 IN | TEMPERATURE: 97.2 F | WEIGHT: 211.7 LBS | SYSTOLIC BLOOD PRESSURE: 129 MMHG | OXYGEN SATURATION: 98 % | DIASTOLIC BLOOD PRESSURE: 66 MMHG | BODY MASS INDEX: 35.27 KG/M2

## 2024-05-01 DIAGNOSIS — E11.9 TYPE 2 DIABETES MELLITUS WITHOUT COMPLICATION, WITHOUT LONG-TERM CURRENT USE OF INSULIN (MULTI): ICD-10-CM

## 2024-05-01 DIAGNOSIS — L40.59 POLYARTICULAR PSORIATIC ARTHRITIS (MULTI): Primary | ICD-10-CM

## 2024-05-01 DIAGNOSIS — L40.9 PSORIASIS: ICD-10-CM

## 2024-05-01 DIAGNOSIS — E66.01 CLASS 2 SEVERE OBESITY DUE TO EXCESS CALORIES WITH SERIOUS COMORBIDITY AND BODY MASS INDEX (BMI) OF 35.0 TO 35.9 IN ADULT (MULTI): ICD-10-CM

## 2024-05-01 DIAGNOSIS — E55.9 VITAMIN D DEFICIENCY: ICD-10-CM

## 2024-05-01 DIAGNOSIS — D84.821 IMMUNOSUPPRESSION DUE TO DRUG THERAPY (MULTI): ICD-10-CM

## 2024-05-01 DIAGNOSIS — Z79.899 IMMUNOSUPPRESSION DUE TO DRUG THERAPY (MULTI): ICD-10-CM

## 2024-05-01 PROCEDURE — 99214 OFFICE O/P EST MOD 30 MIN: CPT | Performed by: INTERNAL MEDICINE

## 2024-05-01 PROCEDURE — 1124F ACP DISCUSS-NO DSCNMKR DOCD: CPT | Performed by: INTERNAL MEDICINE

## 2024-05-01 PROCEDURE — 3074F SYST BP LT 130 MM HG: CPT | Performed by: INTERNAL MEDICINE

## 2024-05-01 PROCEDURE — 1036F TOBACCO NON-USER: CPT | Performed by: INTERNAL MEDICINE

## 2024-05-01 PROCEDURE — 3078F DIAST BP <80 MM HG: CPT | Performed by: INTERNAL MEDICINE

## 2024-05-01 PROCEDURE — 1159F MED LIST DOCD IN RCRD: CPT | Performed by: INTERNAL MEDICINE

## 2024-05-01 PROCEDURE — 4010F ACE/ARB THERAPY RXD/TAKEN: CPT | Performed by: INTERNAL MEDICINE

## 2024-05-01 PROCEDURE — 3008F BODY MASS INDEX DOCD: CPT | Performed by: INTERNAL MEDICINE

## 2024-05-01 RX ORDER — SULFASALAZINE 500 MG/1
500 TABLET, DELAYED RELEASE ORAL 2 TIMES DAILY
COMMUNITY

## 2024-05-01 RX ORDER — ASPIRIN 81 MG/1
81 TABLET ORAL DAILY
COMMUNITY
Start: 2024-03-25

## 2024-05-01 RX ORDER — FAMOTIDINE 40 MG/1
40 TABLET, FILM COATED ORAL 2 TIMES DAILY
COMMUNITY

## 2024-05-01 ASSESSMENT — ENCOUNTER SYMPTOMS
JOINT SWELLING: 0
LIGHT-HEADEDNESS: 0
ARTHRALGIAS: 1
DIZZINESS: 0
WEAKNESS: 0
COUGH: 0
NUMBNESS: 0
COLOR CHANGE: 0
FEVER: 0
SHORTNESS OF BREATH: 1
FATIGUE: 1
MYALGIAS: 1
BACK PAIN: 0

## 2024-05-01 ASSESSMENT — PATIENT HEALTH QUESTIONNAIRE - PHQ9
2. FEELING DOWN, DEPRESSED OR HOPELESS: NOT AT ALL
1. LITTLE INTEREST OR PLEASURE IN DOING THINGS: NOT AT ALL
SUM OF ALL RESPONSES TO PHQ9 QUESTIONS 1 & 2: 0

## 2024-05-01 NOTE — PROGRESS NOTES
RHEUMATOLOGY PROGRESS NOTE  Dianna Hernandez 73 y.o. female  Chief Complaint   Patient presents with    Arthritis     Psoriatic        SUBJECTIVE  Re:  psoriatic arthritis and psoriasis  Pt notes skin is worse (derm gave her an injection) and joint pain has worsened.  Pt again stopped the sulfasalazine almost 6 months ago for perceived hair loss but is still losing hair.   Pt wants to know if she should restart.   Pt is on otezla.  Pt is working on losing weight and starting to modify her diet--has been eating too much sugar which affects her inflammation.  Also reports last vitamin D level was 15 and started replacement yesterday.     Pt not having severe muscle cramping any more since she starting drinking more water.  Is under a lot of stress but plans to spend the summer in [place] and hopefully that will relax her  Knows that she needs TKR and trying to delay.   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    Vitamin D deficiency 10/22/2023    Osteoarthritis of knee 10/22/2023    Class 2 obesity due to excess calories with body mass index (BMI) of 35.0 to 35.9 in adult 10/22/2023    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) 10/22/2023    Type 2 diabetes mellitus, without long-term current use of insulin (Multi) 10/22/2023    Pulmonary nodule 10/22/2023     Past Medical History:   Diagnosis Date    Anal polyp     Anorectal polyp    Anemia     History of anemia    Asthma (HHS-HCC)     History of asthma    Benign neoplasm     History of other benign neoplasm     Biliary dyskinesia     Biliary dyskinesia    Cataract     History of cataract    Diffuse esophageal spasm     Diverticulosis     Diverticulosis of intestine, part unspecified, without perforation or abscess without bleeding     Diverticulosis    Dry eye syndrome of unspecified lacrimal gland     Dry eye syndrome    Dyskinesia of esophagus     Diffuse esophageal spasm    Frontotemporal brain disease (Multi) 2023    Last Assessment & Plan: Formatting of this note might be different from the original. Not scheduled with brain health neurologist until November.  Is concerned because alone and doesn't trust brother and sister.  No one wants to be poa for healthcare, because brother filed a law suit after mother .    Gastric ulcer     History of gastric ulcer    GERD (gastroesophageal reflux disease) 2021    History of gastroesophageal reflux (GERD)    Herniated cervical disc without myelopathy     Herniated cervical disc without myelopathy    Herniated thoracic disc without myelopathy     Herniated thoracic disc without myelopathy    Hiatal hernia     History of hiatal hernia    IBS (irritable bowel syndrome)     History of irritable bowel syndrome    Major depressive disorder, recurrent, moderate (Multi)     Moderate episode of recurrent major depressive disorder    Malignant neoplasm (Multi)     History of malignant neoplasm    Myopia     History of myopia    Neuropathy     History of neuropathy    Obstructive sleep apnea (adult) (pediatric)     ALBINA on CPAP    Ovarian cyst     History of ovarian cyst    Personal history of pneumonia (recurrent)     History of pneumonia    Posterior vitreous detachment     Posterior vitreous detachment    Vitreous floaters     Vitreous floaters     Current Outpatient Medications   Medication Instructions    acetaminophen (TYLENOL) 325 mg, oral, Every 6 hours PRN    albuterol (ProAir HFA) 90 mcg/actuation inhaler 2 puffs, inhalation, Every 4 hours PRN    albuterol 2.5 mg,  nebulization    apremilast (Otezla) 30 mg tablet 1 tablet, oral, 2 times daily    aspirin 81 mg, oral, Daily    baclofen (Lioresal) 10 mg tablet oral, 3 times daily    budesonide (PULMICORT) 0.5 mg, nebulization, 2 times daily, Rinse mouth with water after use to reduce aftertaste and incidence of candidiasis. Do not swallow.    carboxymethylcellulose-glycern 1-0.9 % drops,gel ophthalmic (eye)    cholecalciferol (Vitamin D3) 1,250 mcg (50,000 unit) tablet oral    clobetasol (Temovate) 0.05 % ointment Topical, 2 times daily    econazole nitrate 1 % cream Topical, 2 times daily    famotidine (PEPCID) 40 mg, oral, 2 times daily    fluocinolone (Derma-Smoothe) 0.01 % external oil Topical, 3 times daily    fluticasone (Flonase) 50 mcg/actuation nasal spray 1 spray, Each Nostril, Daily, Shake gently. Before first use, prime pump. After use, clean tip and replace cap.    ibuprofen 200 mg tablet oral, Every 6 hours PRN    losartan (COZAAR) 75 mg, oral, Daily    meloxicam (Mobic) 15 mg tablet oral    nystatin (Nyamyc) 100,000 unit/gram powder Nyamyc [id number] UNIT/GM External Powder   Refills: 0        Start : 15-Oct-2018   Active    PARoxetine (PAXIL) 10 mg, oral, Every morning    potassium chloride CR (Klor-Con 10) 10 mEq ER tablet 1 tablet, oral, Daily    sulfaSALAzine (AZULFIDINE) 500 mg, oral, 2 times daily, Do not crush, chew, or split.    vitamin E cream Vitamin E 100 UNIT/GM CREA   Refills: 0        Start : 15-Oct-2018   Active     Allergies   Allergen Reactions    Nickel Other and Rash    Aspartame Other     Other reaction(s): Other: See Comments   Muscle spasms    Muscle spasms    Egg Other and Diarrhea    Adhesive Tape-Silicones Unknown    American Cockroach Allergenic Extract Other     Other reaction(s): Intolerance    Celecoxib Other     Makes asthma worse    Celery Other    Histamine Other    House Dust Mite Other    Lobster Unknown    Mold Unknown and Other     Other reaction(s): Intolerance    Nifedipine Other  "    Taken off by cardiology due to foot and ankle swelling    Other Unknown     Aspartame, Nutrasweet, Equal    Pecan Nut Itching and Other     Other reaction(s): Intolerance    Rofecoxib Itching    Strawberry Unknown    Wheat Other    Yeast Other     Review of Systems   Constitutional:  Positive for fatigue. Negative for fever.   Respiratory:  Positive for shortness of breath. Negative for cough.    Cardiovascular:  Negative for leg swelling.   Musculoskeletal:  Positive for arthralgias, gait problem and myalgias. Negative for back pain and joint swelling.   Skin:  Positive for rash. Negative for color change.   Neurological:  Negative for dizziness, weakness, light-headedness and numbness.       PHYSICAL EXAM  /66   Pulse 62   Temp 36.2 °C (97.2 °F) (Temporal)   Ht 1.638 m (5' 4.5\")   Wt 96 kg (211 lb 11.2 oz)   SpO2 98%   BMI 35.78 kg/m²   Physical Exam  Vitals reviewed.   Constitutional:       General: She is not in acute distress.     Appearance: Normal appearance.   HENT:      Head: Normocephalic and atraumatic.   Eyes:      General: No scleral icterus.     Extraocular Movements: Extraocular movements intact.      Conjunctiva/sclera: Conjunctivae normal.      Pupils: Pupils are equal, round, and reactive to light.   Pulmonary:      Effort: Pulmonary effort is normal. No respiratory distress.   Musculoskeletal:         General: No swelling, tenderness or deformity.      Cervical back: Normal range of motion and neck supple. No tenderness.      Right lower leg: No edema.      Left lower leg: No edema.      Comments: No synovitis of examined joints   Skin:     Coloration: Skin is not pale.      Findings: Rash present. No erythema.   Neurological:      General: No focal deficit present.      Mental Status: She is alert and oriented to person, place, and time. Mental status is at baseline.      Gait: Gait abnormal (uses cane--atlagic).   Psychiatric:         Mood and Affect: Mood normal. "         Assessment/plan  Problem List Items Addressed This Visit       Polyarticular psoriatic arthritis (Multi) - Primary    Current Assessment & Plan     Advised pt again to restart sulfasalazine.   Consistent use will help the psoriasis and arthritis.  Encouraged healthy diet.   Pt to get labs done soon and will forward to me.    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  Managed by PCP.  Last A1c 7.7%         Psoriasis     Follow up: __6___months        Patient was identified as a fall risk. Risk prevention instructions provided.

## 2024-05-01 NOTE — LETTER
May 1, 2024     Zeny Parker MD  [address]  [place] [address]    Patient: Dianna Hernandez   YOB: 1950   Date of Visit: 5/1/2024       Dear Dr. Zeny Parker MD:    Thank you for referring Dianna Hernandez to me for evaluation. Below are my notes for this visit.  If you have questions, please do not hesitate to call me. I look forward to following your patient along with you.       Sincerely,     Huma Ba MD      CC: No Recipients  ______________________________________________________________________________________    RHEUMATOLOGY PROGRESS NOTE  Dianna Hernandez 73 y.o. female  Chief Complaint   Patient presents with   • Arthritis     Psoriatic        SUBJECTIVE  Re:  psoriatic arthritis and psoriasis  Pt notes skin is worse (derm gave her an injection) and joint pain has worsened.  Pt again stopped the sulfasalazine almost 6 months ago for perceived hair loss but is still losing hair.   Pt wants to know if she should restart.   Pt is on otezla.  Pt is working on losing weight and starting to modify her diet--has been eating too much sugar which affects her inflammation.  Also reports last vitamin D level was 15 and started replacement yesterday.     Pt not having severe muscle cramping any more since she starting drinking more water.  Is under a lot of stress but plans to spend the summer in [place] and hopefully that will relax her  Knows that she needs TKR and trying to delay.   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   •  Vitamin D deficiency 10/22/2023   • Osteoarthritis of knee 10/22/2023   • Class 2 obesity due to excess calories with body mass index (BMI) of 35.0 to 35.9 in adult 10/22/2023   • 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) 10/22/2023   • Type 2 diabetes mellitus, without long-term current use of insulin (Multi) 10/22/2023   • Pulmonary nodule 10/22/2023     Past Medical History:   Diagnosis Date   • Anal polyp     Anorectal polyp   • Anemia     History of anemia   • Asthma (HHS-HCC)     History of asthma   • Benign neoplasm     History of other benign neoplasm   • Biliary dyskinesia     Biliary dyskinesia   • Cataract     History of cataract   • Diffuse esophageal spasm    • Diverticulosis    • Diverticulosis of intestine, part unspecified, without perforation or abscess without bleeding     Diverticulosis   • Dry eye syndrome of unspecified lacrimal gland     Dry eye syndrome   • Dyskinesia of esophagus     Diffuse esophageal spasm   • Frontotemporal brain disease (Multi) 2023    Last Assessment & Plan: Formatting of this note might be different from the original. Not scheduled with brain health neurologist until November.  Is concerned because alone and doesn't trust brother and sister.  No one wants to be poa for healthcare, because brother filed a law suit after mother .   • Gastric ulcer     History of gastric ulcer   • GERD (gastroesophageal reflux disease) 2021    History of gastroesophageal reflux (GERD)   • Herniated cervical disc without myelopathy     Herniated cervical disc without myelopathy   • Herniated thoracic disc without myelopathy     Herniated thoracic disc without myelopathy   • Hiatal hernia     History of hiatal hernia   • IBS (irritable bowel syndrome)     History of irritable bowel syndrome   • Major depressive disorder, recurrent, moderate (Multi)     Moderate episode of recurrent major depressive disorder   • Malignant neoplasm (Multi)     History of malignant neoplasm    • Myopia     History of myopia   • Neuropathy     History of neuropathy   • Obstructive sleep apnea (adult) (pediatric)     ALBINA on CPAP   • Ovarian cyst     History of ovarian cyst   • Personal history of pneumonia (recurrent)     History of pneumonia   • Posterior vitreous detachment     Posterior vitreous detachment   • Vitreous floaters     Vitreous floaters     Current Outpatient Medications   Medication Instructions   • acetaminophen (TYLENOL) 325 mg, oral, Every 6 hours PRN   • albuterol (ProAir HFA) 90 mcg/actuation inhaler 2 puffs, inhalation, Every 4 hours PRN   • albuterol 2.5 mg, nebulization   • apremilast (Otezla) 30 mg tablet 1 tablet, oral, 2 times daily   • aspirin 81 mg, oral, Daily   • baclofen (Lioresal) 10 mg tablet oral, 3 times daily   • budesonide (PULMICORT) 0.5 mg, nebulization, 2 times daily, Rinse mouth with water after use to reduce aftertaste and incidence of candidiasis. Do not swallow.   • carboxymethylcellulose-glycern 1-0.9 % drops,gel ophthalmic (eye)   • cholecalciferol (Vitamin D3) 1,250 mcg (50,000 unit) tablet oral   • clobetasol (Temovate) 0.05 % ointment Topical, 2 times daily   • econazole nitrate 1 % cream Topical, 2 times daily   • famotidine (PEPCID) 40 mg, oral, 2 times daily   • fluocinolone (Derma-Smoothe) 0.01 % external oil Topical, 3 times daily   • fluticasone (Flonase) 50 mcg/actuation nasal spray 1 spray, Each Nostril, Daily, Shake gently. Before first use, prime pump. After use, clean tip and replace cap.   • ibuprofen 200 mg tablet oral, Every 6 hours PRN   • losartan (COZAAR) 75 mg, oral, Daily   • meloxicam (Mobic) 15 mg tablet oral   • nystatin (Nyamyc) 100,000 unit/gram powder Nyamyc [id number] UNIT/GM External Powder   Refills: 0        Start : 15-Oct-2018   Active   • PARoxetine (PAXIL) 10 mg, oral, Every morning   • potassium chloride CR (Klor-Con 10) 10 mEq ER tablet 1 tablet, oral, Daily   • sulfaSALAzine (AZULFIDINE) 500 mg, oral, 2 times daily, Do not  "crush, chew, or split.   • vitamin E cream Vitamin E 100 UNIT/GM CREA   Refills: 0        Start : 15-Oct-2018   Active     Allergies   Allergen Reactions   • Nickel Other and Rash   • Aspartame Other     Other reaction(s): Other: See Comments   Muscle spasms    Muscle spasms   • Egg Other and Diarrhea   • Adhesive Tape-Silicones Unknown   • American Cockroach Allergenic Extract Other     Other reaction(s): Intolerance   • Celecoxib Other     Makes asthma worse   • Celery Other   • Histamine Other   • House Dust Mite Other   • Lobster Unknown   • Mold Unknown and Other     Other reaction(s): Intolerance   • Nifedipine Other     Taken off by cardiology due to foot and ankle swelling   • Other Unknown     Aspartame, Nutrasweet, Equal   • Pecan Nut Itching and Other     Other reaction(s): Intolerance   • Rofecoxib Itching   • Strawberry Unknown   • Wheat Other   • Yeast Other     Review of Systems   Constitutional:  Positive for fatigue. Negative for fever.   Respiratory:  Positive for shortness of breath. Negative for cough.    Cardiovascular:  Negative for leg swelling.   Musculoskeletal:  Positive for arthralgias, gait problem and myalgias. Negative for back pain and joint swelling.   Skin:  Positive for rash. Negative for color change.   Neurological:  Negative for dizziness, weakness, light-headedness and numbness.       PHYSICAL EXAM  /66   Pulse 62   Temp 36.2 °C (97.2 °F) (Temporal)   Ht 1.638 m (5' 4.5\")   Wt 96 kg (211 lb 11.2 oz)   SpO2 98%   BMI 35.78 kg/m²   Physical Exam  Vitals reviewed.   Constitutional:       General: She is not in acute distress.     Appearance: Normal appearance.   HENT:      Head: Normocephalic and atraumatic.   Eyes:      General: No scleral icterus.     Extraocular Movements: Extraocular movements intact.      Conjunctiva/sclera: Conjunctivae normal.      Pupils: Pupils are equal, round, and reactive to light.   Pulmonary:      Effort: Pulmonary effort is normal. No " respiratory distress.   Musculoskeletal:         General: No swelling, tenderness or deformity.      Cervical back: Normal range of motion and neck supple. No tenderness.      Right lower leg: No edema.      Left lower leg: No edema.      Comments: No synovitis of examined joints   Skin:     Coloration: Skin is not pale.      Findings: Rash present. No erythema.   Neurological:      General: No focal deficit present.      Mental Status: She is alert and oriented to person, place, and time. Mental status is at baseline.      Gait: Gait abnormal (uses cane--atlagic).   Psychiatric:         Mood and Affect: Mood normal.         Assessment/plan  Problem List Items Addressed This Visit       Polyarticular psoriatic arthritis (Multi) - Primary    Current Assessment & Plan     Advised pt again to restart sulfasalazine.   Consistent use will help the psoriasis and arthritis.  Encouraged healthy diet.   Pt to get labs done soon and will forward to me.    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  Managed by PCP.  Last A1c 7.7%         Psoriasis     Follow up: __6___months        Patient was identified as a fall risk. Risk prevention instructions provided.

## 2024-05-01 NOTE — PATIENT INSTRUCTIONS
It was a pleasure to see you today  Please call if your symptoms worsen  Please review your summary for education and reminders.  Follow up at your next appointment.    If you had labs/xrays done today, you will be able to view on [employer].   We will contact you when the results are reviewed for further discussion.  Please note that you may receive your results before I have had a chance to review.  Please know I will be contacting you for discussion  Homegoing instructions for all patient  A healthy lifestyle helps chronic diseases  These are all the goals you should strive to improve your overall health   Blood pressure <130/85   BMI of <30 or waist circumference that is 1/2 of your height   Fasting blood sugar <107 (if you are diabetic, aim for an A1c <6.4%_   LDL cholesterol <130   Avoid smoking   Manage your stress   Get your preventive exams   Get your immunizations       Ways to Help Prevent Falls at Home    Quick Tips   ? Ask for help if you need it. Most people want to help!   ? Get up slowly after sitting or laying down   ? Wear a medical alert device or keep cell phone in your pocket   ? Use night lights, especially areas near a bathroom   ? Keep the items you use often within reach on a small stool or end table   ? Use an assistive device such as walker or cane, as directed by provider/physical therapy   ? Use a non-slip mat and grab bars in your bathroom. Look for home health sections for best options     Other Areas to Focus On   ? Exercise and nutrition: Regular exercise or taking a falls prevention class are great ways improve strength and balance. Don’t forget to stay hydrated and bring a snack!   ? Medicine side effects: Some medicines can make you sleepy or dizzy, which could cause a fall. Ask your healthcare provider about the side effects your medicines could cause. Be sure to let them know if you take any vitamins or supplements as well.   ? Tripping hazards: Remove items you could trip on, such  as loose mats, rugs, cords, and clutter. Wear closed toe shoes with rubber soles.   ? Health and wellness: Get regular checkups with your healthcare provider, plus routine vision and hearing screenings. Talk with your healthcare provider about:   o Your medicines and the possible side effects - bring them in a bag if that is easier!   o Problems with balance or feeling dizzy   o Ways to promote bone health, such as Vitamin D and calcium supplements   o Questions or concerns about falling     *Ask your healthcare team if you have questions     ©[facility], 2022

## 2024-05-01 NOTE — ASSESSMENT & PLAN NOTE
Advised pt again to restart sulfasalazine.   Consistent use will help the psoriasis and arthritis.  Encouraged healthy diet.   Pt to get labs done soon and will forward to me.    Continue otzela which helps the rash more than the joint involvement

## 2024-05-24 ENCOUNTER — TELEPHONE (OUTPATIENT)
Dept: PRIMARY CARE | Facility: CLINIC | Age: 74
End: 2024-05-24
Payer: MEDICARE

## 2024-05-24 NOTE — TELEPHONE ENCOUNTER
Pt called in regards to the lab results from [facility].       She also wants me to mail her a copy. I will send now. *okay to leave message*      [phone]

## 2024-11-14 RX ORDER — ASPIRIN 325 MG
50000 TABLET, DELAYED RELEASE (ENTERIC COATED) ORAL 2 TIMES WEEKLY
COMMUNITY
Start: 2024-04-29

## 2024-11-15 ENCOUNTER — APPOINTMENT (OUTPATIENT)
Dept: RHEUMATOLOGY | Facility: CLINIC | Age: 74
End: 2024-11-15
Payer: MEDICARE

## 2024-11-15 VITALS
DIASTOLIC BLOOD PRESSURE: 72 MMHG | HEIGHT: 64 IN | BODY MASS INDEX: 35.58 KG/M2 | HEART RATE: 70 BPM | TEMPERATURE: 97.6 F | OXYGEN SATURATION: 99 % | WEIGHT: 208.4 LBS | SYSTOLIC BLOOD PRESSURE: 138 MMHG

## 2024-11-15 DIAGNOSIS — L40.59 POLYARTICULAR PSORIATIC ARTHRITIS (MULTI): Primary | ICD-10-CM

## 2024-11-15 DIAGNOSIS — I73.9 PERIPHERAL VASCULAR DISEASE, UNSPECIFIED (CMS-HCC): ICD-10-CM

## 2024-11-15 DIAGNOSIS — E11.36 TYPE 2 DIABETES MELLITUS WITH DIABETIC CATARACT, WITHOUT LONG-TERM CURRENT USE OF INSULIN: ICD-10-CM

## 2024-11-15 DIAGNOSIS — D84.821 IMMUNOSUPPRESSION DUE TO DRUG THERAPY: ICD-10-CM

## 2024-11-15 DIAGNOSIS — S46.011D TRAUMATIC COMPLETE TEAR OF RIGHT ROTATOR CUFF, SUBSEQUENT ENCOUNTER: ICD-10-CM

## 2024-11-15 DIAGNOSIS — Z79.899 IMMUNOSUPPRESSION DUE TO DRUG THERAPY: ICD-10-CM

## 2024-11-15 DIAGNOSIS — M79.7 FIBROMYALGIA: ICD-10-CM

## 2024-11-15 PROBLEM — S46.011A TRAUMATIC COMPLETE TEAR OF RIGHT ROTATOR CUFF: Status: ACTIVE | Noted: 2024-11-15

## 2024-11-15 PROBLEM — M75.121 COMPLETE ROTATOR CUFF TEAR OR RUPTURE OF RIGHT SHOULDER, NOT SPECIFIED AS TRAUMATIC: Status: RESOLVED | Noted: 2024-11-11 | Resolved: 2024-11-15

## 2024-11-15 PROBLEM — M96.1 CERVICAL POSTLAMINECTOMY SYNDROME: Status: ACTIVE | Noted: 2024-11-11

## 2024-11-15 PROCEDURE — 3008F BODY MASS INDEX DOCD: CPT | Performed by: INTERNAL MEDICINE

## 2024-11-15 PROCEDURE — 1036F TOBACCO NON-USER: CPT | Performed by: INTERNAL MEDICINE

## 2024-11-15 PROCEDURE — 4010F ACE/ARB THERAPY RXD/TAKEN: CPT | Performed by: INTERNAL MEDICINE

## 2024-11-15 PROCEDURE — 3078F DIAST BP <80 MM HG: CPT | Performed by: INTERNAL MEDICINE

## 2024-11-15 PROCEDURE — 99214 OFFICE O/P EST MOD 30 MIN: CPT | Performed by: INTERNAL MEDICINE

## 2024-11-15 PROCEDURE — 3075F SYST BP GE 130 - 139MM HG: CPT | Performed by: INTERNAL MEDICINE

## 2024-11-15 PROCEDURE — 1160F RVW MEDS BY RX/DR IN RCRD: CPT | Performed by: INTERNAL MEDICINE

## 2024-11-15 PROCEDURE — 1124F ACP DISCUSS-NO DSCNMKR DOCD: CPT | Performed by: INTERNAL MEDICINE

## 2024-11-15 PROCEDURE — 1159F MED LIST DOCD IN RCRD: CPT | Performed by: INTERNAL MEDICINE

## 2024-11-15 RX ORDER — PRAVASTATIN SODIUM 80 MG/1
80 TABLET ORAL DAILY
COMMUNITY
Start: 2024-08-23 | End: 2025-08-23

## 2024-11-15 RX ORDER — TRAMADOL HYDROCHLORIDE 50 MG/1
50 TABLET ORAL EVERY 6 HOURS PRN
COMMUNITY
Start: 2024-11-11

## 2024-11-15 RX ORDER — TOPIRAMATE 50 MG/1
50 TABLET, FILM COATED ORAL
COMMUNITY
Start: 2024-08-23 | End: 2025-08-23

## 2024-11-15 ASSESSMENT — PATIENT HEALTH QUESTIONNAIRE - PHQ9
1. LITTLE INTEREST OR PLEASURE IN DOING THINGS: NOT AT ALL
SUM OF ALL RESPONSES TO PHQ9 QUESTIONS 1 & 2: 0
2. FEELING DOWN, DEPRESSED OR HOPELESS: NOT AT ALL

## 2024-11-15 ASSESSMENT — ENCOUNTER SYMPTOMS
MYALGIAS: 1
BACK PAIN: 0
FATIGUE: 0
SHORTNESS OF BREATH: 0
COLOR CHANGE: 0
DIZZINESS: 1
WEAKNESS: 0
ARTHRALGIAS: 1
COUGH: 0
PALPITATIONS: 1
FEVER: 0
NUMBNESS: 0
OCCASIONAL FEELINGS OF UNSTEADINESS: 1
JOINT SWELLING: 0

## 2024-11-15 NOTE — PROGRESS NOTES
[facility] RHEUMATOLOGY     AND INTERNAL MEDICINE    RHEUMATOLOGY PROGRESS NOTE  Dianna Hernandez 74 y.o. female  Chief Complaint   Patient presents with    Follow-up     Psoriatic arthritis        SUBJECTIVE  Had a rough summer.  More dyspnea and palpitations.  Saw Dr Sanchez  Didn't go to [place] this summer  In July, dizziness and lost balance.   Tore tendon in R shoulder.  Had injection. Considering surgery  Trouble turning steering wheel but pain has decreased  Re:  arthritis.  Had been stable.  Eating sugar increases inflammation  Reports she had a Fibroscan for fatty liver     Lower back pain.  Trouble sleeping due to the pain.  Was in ED in August.  Rule out stroke.  MRI done.   Neuro thinks it may be migraines.   (States she had a remote MRI that showed 2 lesions--still needs specialist to explain in detail;  possible old stroke)---pt was worried it was due to parkinson's.  Pt had the MRI in her personal files and I reviewed with pt and reassured her that there were no basal ganglia lesions          Records since last seen reviewed in [facility], [facility] and [facility] Record  Patient Active Problem List    Diagnosis Date Noted    Peripheral vascular disease, unspecified (CMS-HCC) 11/15/2024    Type 2 diabetes mellitus with diabetic cataract, without long-term current use of insulin 11/15/2024    Cervical postlaminectomy syndrome 11/11/2024    Fibromyalgia 10/25/2023    Psoriasis 10/25/2023    Benign essential hypertension 10/22/2023    Hypokalemia 10/22/2023    Lumbar spinal stenosis 10/22/2023    Mixed hyperlipidemia 10/22/2023    Tear of triangular fibrocartilage complex (TFCC), sequela 10/22/2023    Polyarticular psoriatic arthritis (Multi) 10/22/2023    Vitamin D deficiency 10/22/2023    Osteoarthritis of knee 10/22/2023    Class 2 obesity due to excess calories with body mass index (BMI) of 35.0 to 35.9 in adult 10/22/2023    Immunosuppression due to  drug therapy 10/22/2023    Type 2 diabetes mellitus, without long-term current use of insulin (Multi) 10/22/2023    Pulmonary nodule 10/22/2023     Past Medical History:   Diagnosis Date    Anal polyp     Anorectal polyp    Anemia     History of anemia    Asthma     History of asthma    Benign neoplasm     History of other benign neoplasm    Biliary dyskinesia     Biliary dyskinesia    Cataract     History of cataract    Complete rotator cuff tear or rupture of right shoulder, not specified as traumatic 2024    Diffuse esophageal spasm     Diverticulosis     Diverticulosis of intestine, part unspecified, without perforation or abscess without bleeding     Diverticulosis    Dry eye syndrome of unspecified lacrimal gland     Dry eye syndrome    Dyskinesia of esophagus     Diffuse esophageal spasm    Frontotemporal brain disease (Multi) 2023    Last Assessment & Plan: Formatting of this note might be different from the original. Not scheduled with brain health neurologist until November.  Is concerned because alone and doesn't trust brother and sister.  No one wants to be poa for healthcare, because brother filed a law suit after mother .    Gastric ulcer     History of gastric ulcer    GERD (gastroesophageal reflux disease) 2021    History of gastroesophageal reflux (GERD)    Herniated cervical disc without myelopathy     Herniated cervical disc without myelopathy    Herniated thoracic disc without myelopathy     Herniated thoracic disc without myelopathy    Hiatal hernia     History of hiatal hernia    IBS (irritable bowel syndrome)     History of irritable bowel syndrome    Major depressive disorder, recurrent, moderate     Moderate episode of recurrent major depressive disorder    Malignant neoplasm (Multi)     History of malignant neoplasm    Myopia     History of myopia    Neuropathy     History of neuropathy    Obstructive sleep apnea (adult) (pediatric)     ALBINA on CPAP    Ovarian cyst      History of ovarian cyst    Personal history of pneumonia (recurrent)     History of pneumonia    Posterior vitreous detachment     Posterior vitreous detachment    Vitreous floaters     Vitreous floaters     Current Outpatient Medications on File Prior to Visit   Medication Sig Dispense Refill    acetaminophen (Tylenol) 325 mg tablet Take 1 tablet (325 mg) by mouth every 6 hours if needed for mild pain (1 - 3).      albuterol (ProAir HFA) 90 mcg/actuation inhaler Inhale 2 puffs every 4 hours if needed for wheezing or shortness of breath (Cough).      albuterol 2.5 mg /3 mL (0.083 %) nebulizer solution Take 3 mL (2.5 mg) by nebulization.      apremilast (Otezla) 30 mg tablet Take 1 tablet (30 mg) by mouth 2 times a day.      aspirin 81 mg EC tablet Take 1 tablet (81 mg) by mouth once daily.      baclofen (Lioresal) 10 mg tablet Take by mouth 3 times a day.      budesonide (Pulmicort) 0.5 mg/2 mL nebulizer solution Take 2 mL (0.5 mg) by nebulization 2 times a day. Rinse mouth with water after use to reduce aftertaste and incidence of candidiasis. Do not swallow.      carboxymethylcellulose-glycern 1-0.9 % drops,gel Administer into affected eye(s).      cholecalciferol (Vitamin D-3) 50,000 unit capsule Take 1 capsule (50,000 Units) by mouth 2 times a week.      clobetasol (Temovate) 0.05 % ointment Apply topically 2 times a day.      econazole nitrate 1 % cream Apply topically 2 times a day.      famotidine (Pepcid) 40 mg tablet Take 1 tablet (40 mg) by mouth 2 times a day.      fluocinolone (Derma-Smoothe) 0.01 % external oil Apply topically 3 times a day.      fluticasone (Flonase) 50 mcg/actuation nasal spray Administer 1 spray into each nostril once daily. Shake gently. Before first use, prime pump. After use, clean tip and replace cap.      ibuprofen 200 mg tablet Take by mouth every 6 hours if needed for mild pain (1 - 3).      losartan (Cozaar) 50 mg tablet Take 1.5 tablets (75 mg) by mouth once daily.       meloxicam (Mobic) 15 mg tablet Take by mouth.      nystatin (Nyamyc) 100,000 unit/gram powder Nyamyc 861167 UNIT/GM External Powder   Refills: 0        Start : 15-Oct-2018   Active      PARoxetine (Paxil) 10 mg tablet Take 1 tablet (10 mg) by mouth once daily in the morning.      potassium chloride CR (Klor-Con 10) 10 mEq ER tablet Take 1 tablet (10 mEq) by mouth once daily.      pravastatin (Pravachol) 80 mg tablet Take 1 tablet (80 mg) by mouth once daily.      sulfaSALAzine (Azulfidine) 500 mg DR tablet Take 1 tablet (500 mg) by mouth 2 times a day. Do not crush, chew, or split.      topiramate (Topamax) 50 mg tablet Take 1 tablet (50 mg) by mouth once daily.      traMADol (Ultram) 50 mg tablet Take 1 tablet (50 mg) by mouth every 6 hours if needed for moderate pain (4 - 6).      vitamin E cream Vitamin E 100 UNIT/GM CREA   Refills: 0        Start : 15-Oct-2018   Active      [DISCONTINUED] cholecalciferol (Vitamin D3) 1,250 mcg (50,000 unit) tablet Take by mouth.       No current facility-administered medications on file prior to visit.     Allergies   Allergen Reactions    Nickel Other and Rash    Aspartame Other     Other reaction(s): Other: See Comments   Muscle spasms    Muscle spasms    Egg Other and Diarrhea    Spironolactone Swelling    Adhesive Tape-Silicones Unknown    American Cockroach Allergenic Extract Other     Other reaction(s): Intolerance    Celecoxib Other     Makes asthma worse    Celery Other    Histamine Other    House Dust Mite Other    Lobster Unknown    Mold Unknown and Other     Other reaction(s): Intolerance    Nifedipine Other     Taken off by cardiology due to foot and ankle swelling    Other Unknown     Aspartame, Nutrasweet, Equal    Pecan Nut Itching and Other     Other reaction(s): Intolerance    Rofecoxib Itching    Strawberry Unknown    Wheat Other    Yeast Other     Social History     Tobacco Use    Smoking status: Never    Smokeless tobacco: Never   Vaping Use    Vaping status:  "Never Used   Substance Use Topics    Alcohol use: Never    Drug use: Never     Review of Systems   Constitutional:  Negative for fatigue and fever.   Respiratory:  Negative for cough and shortness of breath.    Cardiovascular:  Positive for palpitations. Negative for leg swelling.   Musculoskeletal:  Positive for arthralgias and myalgias. Negative for back pain, gait problem and joint swelling.   Skin:  Negative for color change and rash.   Neurological:  Positive for dizziness. Negative for weakness and numbness.       PHYSICAL EXAM  /72   Pulse 70   Temp 36.4 °C (97.6 °F) (Temporal)   Ht 1.626 m (5' 4\")   Wt 94.5 kg (208 lb 6.4 oz)   SpO2 99%   BMI 35.77 kg/m²   Depression: Not at risk (11/15/2024)    PHQ-2     PHQ-2 Score: 0     Physical Exam  Vitals reviewed.   Constitutional:       General: She is not in acute distress.     Appearance: Normal appearance.   HENT:      Head: Normocephalic and atraumatic.   Eyes:      General: No scleral icterus.     Extraocular Movements: Extraocular movements intact.      Conjunctiva/sclera: Conjunctivae normal.      Pupils: Pupils are equal, round, and reactive to light.   Pulmonary:      Effort: Pulmonary effort is normal. No respiratory distress.   Musculoskeletal:         General: No swelling, tenderness or deformity.      Cervical back: Normal range of motion and neck supple. No tenderness.      Right lower leg: No edema.      Left lower leg: No edema.      Comments: No synovitis of examined joints  Limited ROM of R shoulder   Skin:     Coloration: Skin is not pale.      Findings: Rash present. No erythema.   Neurological:      General: No focal deficit present.      Mental Status: She is alert and oriented to person, place, and time. Mental status is at baseline.      Gait: Gait abnormal (uses cane--atlagic).   Psychiatric:         Mood and Affect: Mood normal.       Health Maintenance Due   Topic Date Due    Medicare Annual Wellness Visit (AWV)  Never done    " Hepatitis A Vaccines (1 of 2 - Risk 2-dose series) Never done    Zoster Vaccines (1 of 2) 08/25/2011    RSV High Risk: (Elderly (60+) or Pregnant Population) (1 - Risk 60-74 years 1-dose series) Never done    Hepatitis B Vaccines (1 of 3 - Risk 3-dose series) Never done    Influenza Vaccine (1) 09/01/2024    COVID-19 Vaccine (5 - 2024-25 season) 09/01/2024    Diabetes: Hemoglobin A1C  11/23/2024       Assessment/plan  Assessment & Plan  Polyarticular psoriatic arthritis (Multi)  On sulfasalazine and otezla.  Continue meds.  Labs ordered today to monitor for increased disease activity, end organ manifestations and to monitor for any drug toxicities due to chronic medications    Orders:    CBC and Auto Differential; Future    Comprehensive Metabolic Panel; Future    C-Reactive Protein; Future    Sedimentation Rate; Future    Fibromyalgia  Stable.  Pt with decreased activity this past year.  Encouraged movement       Immunosuppression due to drug therapy         Peripheral vascular disease, unspecified (CMS-HCC)  Noted on chart review.  No attributable symptoms.  Pt to be monitored by PCP         Type 2 diabetes mellitus with diabetic cataract, without long-term current use of insulin  Noted on chart review  Pt with history of diabetes now with cataract and vision changes.  Pt to see PCP and ophtho for monitoring on a regular basis         Traumatic complete tear of right rotator cuff, subsequent encounter  Pt to schedule repair to restore mobility         Follow up: ___4__months    Immunization History   Administered Date(s) Administered    Flu vaccine, quadrivalent, high-dose, preservative free, age 65y+ (FLUZONE) 10/25/2022    Flu vaccine, trivalent, preservative free, HIGH-DOSE, age 65y+ (Fluzone) 11/27/2018, 10/07/2019, 12/06/2021    Influenza Nasal, Unspecified 10/29/2010    Influenza, Unspecified 11/18/2002, 10/21/2005, 10/06/2008, 10/29/2010, 10/24/2011    Influenza, seasonal, injectable 11/18/2002, 10/21/2005,  10/06/2008, 10/29/2010, 10/24/2011    Moderna COVID-19 vaccine, bivalent, blue cap/gray label *Check age/dose* 11/13/2022    Moderna SARS-CoV-2 Vaccination 03/10/2021, 04/07/2021, 12/20/2021    Pfizer Gray Cap SARS-CoV-2 05/26/2022    Pneumococcal conjugate vaccine, 13-valent (PREVNAR 13) 10/07/2019    Pneumococcal conjugate vaccine, 20-valent (PREVNAR 20) 02/23/2023    Pneumococcal polysaccharide vaccine, 23-valent, age 2 years and older (PNEUMOVAX 23) 11/18/2002    Td (adult), unspecified 12/07/2016    Tdap vaccine, age 7 year and older (BOOSTRIX, ADACEL) 02/14/2009, 12/07/2016    Zoster, live 06/30/2011           Patient was identified as a fall risk. Risk prevention instructions provided.

## 2024-11-15 NOTE — ASSESSMENT & PLAN NOTE
Noted on chart review  Pt with history of diabetes now with cataract and vision changes.  Pt to see PCP and ophtho for monitoring on a regular basis

## 2024-11-15 NOTE — LETTER
November 15, 2024     Zeny Parker MD  [address] [place] [address]    Patient: Dianna Hernandez   YOB: 1950   Date of Visit: 11/15/2024       Dear Dr. Zeny Parker MD:    Thank you for referring Dianna Hernandez to me for evaluation. Below are my notes for this visit.  If you have questions, please do not hesitate to call me. I look forward to following your patient along with you.       Sincerely,     Huma Ba MD      CC: No Recipients  ______________________________________________________________________________________                                                    [facility] RHEUMATOLOGY     AND INTERNAL MEDICINE    RHEUMATOLOGY PROGRESS NOTE  Dianna Hernandez 74 y.o. female  Chief Complaint   Patient presents with   • Follow-up     Psoriatic arthritis        SUBJECTIVE  Had a rough summer.  More dyspnea and palpitations.  Saw Dr Sanchez  Didn't go to [place] this summer  In July, dizziness and lost balance.   Tore tendon in R shoulder.  Had injection. Considering surgery  Trouble turning steering wheel but pain has decreased  Re:  arthritis.  Had been stable.  Eating sugar increases inflammation  Reports she had a Fibroscan for fatty liver     Lower back pain.  Trouble sleeping due to the pain.  Was in ED in August.  Rule out stroke.  MRI done.   Neuro thinks it may be migraines.   (States she had a remote MRI that showed 2 lesions--still needs specialist to explain in detail;  possible old stroke)---pt was worried it was due to parkinson's.  Pt had the MRI in her personal files and I reviewed with pt and reassured her that there were no basal ganglia lesions          Records since last seen reviewed in [facility], [facility] and [facility] Record  Patient Active Problem List    Diagnosis Date Noted   • Peripheral vascular disease, unspecified (CMS-HCC) 11/15/2024   • Type 2 diabetes mellitus with diabetic cataract, without long-term current use of insulin 11/15/2024    • Cervical postlaminectomy syndrome 2024   • Fibromyalgia 10/25/2023   • Psoriasis 10/25/2023   • Benign essential hypertension 10/22/2023   • Hypokalemia 10/22/2023   • Lumbar spinal stenosis 10/22/2023   • Mixed hyperlipidemia 10/22/2023   • Tear of triangular fibrocartilage complex (TFCC), sequela 10/22/2023   • Polyarticular psoriatic arthritis (Multi) 10/22/2023   • Vitamin D deficiency 10/22/2023   • Osteoarthritis of knee 10/22/2023   • Class 2 obesity due to excess calories with body mass index (BMI) of 35.0 to 35.9 in adult 10/22/2023   • Immunosuppression due to drug therapy 10/22/2023   • Type 2 diabetes mellitus, without long-term current use of insulin (Multi) 10/22/2023   • Pulmonary nodule 10/22/2023     Past Medical History:   Diagnosis Date   • Anal polyp     Anorectal polyp   • Anemia     History of anemia   • Asthma     History of asthma   • Benign neoplasm     History of other benign neoplasm   • Biliary dyskinesia     Biliary dyskinesia   • Cataract     History of cataract   • Complete rotator cuff tear or rupture of right shoulder, not specified as traumatic 2024   • Diffuse esophageal spasm    • Diverticulosis    • Diverticulosis of intestine, part unspecified, without perforation or abscess without bleeding     Diverticulosis   • Dry eye syndrome of unspecified lacrimal gland     Dry eye syndrome   • Dyskinesia of esophagus     Diffuse esophageal spasm   • Frontotemporal brain disease (Multi) 2023    Last Assessment & Plan: Formatting of this note might be different from the original. Not scheduled with brain health neurologist until November.  Is concerned because alone and doesn't trust brother and sister.  No one wants to be poa for healthcare, because brother filed a law suit after mother .   • Gastric ulcer     History of gastric ulcer   • GERD (gastroesophageal reflux disease) 2021    History of gastroesophageal reflux (GERD)   • Herniated cervical disc  without myelopathy     Herniated cervical disc without myelopathy   • Herniated thoracic disc without myelopathy     Herniated thoracic disc without myelopathy   • Hiatal hernia     History of hiatal hernia   • IBS (irritable bowel syndrome)     History of irritable bowel syndrome   • Major depressive disorder, recurrent, moderate     Moderate episode of recurrent major depressive disorder   • Malignant neoplasm (Multi)     History of malignant neoplasm   • Myopia     History of myopia   • Neuropathy     History of neuropathy   • Obstructive sleep apnea (adult) (pediatric)     ALBINA on CPAP   • Ovarian cyst     History of ovarian cyst   • Personal history of pneumonia (recurrent)     History of pneumonia   • Posterior vitreous detachment     Posterior vitreous detachment   • Vitreous floaters     Vitreous floaters     Current Outpatient Medications on File Prior to Visit   Medication Sig Dispense Refill   • acetaminophen (Tylenol) 325 mg tablet Take 1 tablet (325 mg) by mouth every 6 hours if needed for mild pain (1 - 3).     • albuterol (ProAir HFA) 90 mcg/actuation inhaler Inhale 2 puffs every 4 hours if needed for wheezing or shortness of breath (Cough).     • albuterol 2.5 mg /3 mL (0.083 %) nebulizer solution Take 3 mL (2.5 mg) by nebulization.     • apremilast (Otezla) 30 mg tablet Take 1 tablet (30 mg) by mouth 2 times a day.     • aspirin 81 mg EC tablet Take 1 tablet (81 mg) by mouth once daily.     • baclofen (Lioresal) 10 mg tablet Take by mouth 3 times a day.     • budesonide (Pulmicort) 0.5 mg/2 mL nebulizer solution Take 2 mL (0.5 mg) by nebulization 2 times a day. Rinse mouth with water after use to reduce aftertaste and incidence of candidiasis. Do not swallow.     • carboxymethylcellulose-glycern 1-0.9 % drops,gel Administer into affected eye(s).     • cholecalciferol (Vitamin D-3) 50,000 unit capsule Take 1 capsule (50,000 Units) by mouth 2 times a week.     • clobetasol (Temovate) 0.05 % ointment  Apply topically 2 times a day.     • econazole nitrate 1 % cream Apply topically 2 times a day.     • famotidine (Pepcid) 40 mg tablet Take 1 tablet (40 mg) by mouth 2 times a day.     • fluocinolone (Derma-Smoothe) 0.01 % external oil Apply topically 3 times a day.     • fluticasone (Flonase) 50 mcg/actuation nasal spray Administer 1 spray into each nostril once daily. Shake gently. Before first use, prime pump. After use, clean tip and replace cap.     • ibuprofen 200 mg tablet Take by mouth every 6 hours if needed for mild pain (1 - 3).     • losartan (Cozaar) 50 mg tablet Take 1.5 tablets (75 mg) by mouth once daily.     • meloxicam (Mobic) 15 mg tablet Take by mouth.     • nystatin (Nyamyc) 100,000 unit/gram powder Nyamyc 305531 UNIT/GM External Powder   Refills: 0        Start : 15-Oct-2018   Active     • PARoxetine (Paxil) 10 mg tablet Take 1 tablet (10 mg) by mouth once daily in the morning.     • potassium chloride CR (Klor-Con 10) 10 mEq ER tablet Take 1 tablet (10 mEq) by mouth once daily.     • pravastatin (Pravachol) 80 mg tablet Take 1 tablet (80 mg) by mouth once daily.     • sulfaSALAzine (Azulfidine) 500 mg DR tablet Take 1 tablet (500 mg) by mouth 2 times a day. Do not crush, chew, or split.     • topiramate (Topamax) 50 mg tablet Take 1 tablet (50 mg) by mouth once daily.     • traMADol (Ultram) 50 mg tablet Take 1 tablet (50 mg) by mouth every 6 hours if needed for moderate pain (4 - 6).     • vitamin E cream Vitamin E 100 UNIT/GM CREA   Refills: 0        Start : 15-Oct-2018   Active     • [DISCONTINUED] cholecalciferol (Vitamin D3) 1,250 mcg (50,000 unit) tablet Take by mouth.       No current facility-administered medications on file prior to visit.     Allergies   Allergen Reactions   • Nickel Other and Rash   • Aspartame Other     Other reaction(s): Other: See Comments   Muscle spasms    Muscle spasms   • Egg Other and Diarrhea   • Spironolactone Swelling   • Adhesive Tape-Silicones Unknown  "  • American Cockroach Allergenic Extract Other     Other reaction(s): Intolerance   • Celecoxib Other     Makes asthma worse   • Celery Other   • Histamine Other   • House Dust Mite Other   • Lobster Unknown   • Mold Unknown and Other     Other reaction(s): Intolerance   • Nifedipine Other     Taken off by cardiology due to foot and ankle swelling   • Other Unknown     Aspartame, Nutrasweet, Equal   • Pecan Nut Itching and Other     Other reaction(s): Intolerance   • Rofecoxib Itching   • Strawberry Unknown   • Wheat Other   • Yeast Other     Social History     Tobacco Use   • Smoking status: Never   • Smokeless tobacco: Never   Vaping Use   • Vaping status: Never Used   Substance Use Topics   • Alcohol use: Never   • Drug use: Never     Review of Systems   Constitutional:  Negative for fatigue and fever.   Respiratory:  Negative for cough and shortness of breath.    Cardiovascular:  Positive for palpitations. Negative for leg swelling.   Musculoskeletal:  Positive for arthralgias and myalgias. Negative for back pain, gait problem and joint swelling.   Skin:  Negative for color change and rash.   Neurological:  Positive for dizziness. Negative for weakness and numbness.       PHYSICAL EXAM  /72   Pulse 70   Temp 36.4 °C (97.6 °F) (Temporal)   Ht 1.626 m (5' 4\")   Wt 94.5 kg (208 lb 6.4 oz)   SpO2 99%   BMI 35.77 kg/m²   Depression: Not at risk (11/15/2024)    PHQ-2    • PHQ-2 Score: 0     Physical Exam  Vitals reviewed.   Constitutional:       General: She is not in acute distress.     Appearance: Normal appearance.   HENT:      Head: Normocephalic and atraumatic.   Eyes:      General: No scleral icterus.     Extraocular Movements: Extraocular movements intact.      Conjunctiva/sclera: Conjunctivae normal.      Pupils: Pupils are equal, round, and reactive to light.   Pulmonary:      Effort: Pulmonary effort is normal. No respiratory distress.   Musculoskeletal:         General: No swelling, tenderness " or deformity.      Cervical back: Normal range of motion and neck supple. No tenderness.      Right lower leg: No edema.      Left lower leg: No edema.      Comments: No synovitis of examined joints  Limited ROM of R shoulder   Skin:     Coloration: Skin is not pale.      Findings: Rash present. No erythema.   Neurological:      General: No focal deficit present.      Mental Status: She is alert and oriented to person, place, and time. Mental status is at baseline.      Gait: Gait abnormal (uses cane--atlagic).   Psychiatric:         Mood and Affect: Mood normal.       Health Maintenance Due   Topic Date Due   • Medicare Annual Wellness Visit (AWV)  Never done   • Hepatitis A Vaccines (1 of 2 - Risk 2-dose series) Never done   • Zoster Vaccines (1 of 2) 08/25/2011   • RSV High Risk: (Elderly (60+) or Pregnant Population) (1 - Risk 60-74 years 1-dose series) Never done   • Hepatitis B Vaccines (1 of 3 - Risk 3-dose series) Never done   • Influenza Vaccine (1) 09/01/2024   • COVID-19 Vaccine (5 - 2024-25 season) 09/01/2024   • Diabetes: Hemoglobin A1C  11/23/2024       Assessment/plan  Assessment & Plan  Polyarticular psoriatic arthritis (Multi)  On sulfasalazine and otezla.  Continue meds.  Labs ordered today to monitor for increased disease activity, end organ manifestations and to monitor for any drug toxicities due to chronic medications    Orders:  •  CBC and Auto Differential; Future  •  Comprehensive Metabolic Panel; Future  •  C-Reactive Protein; Future  •  Sedimentation Rate; Future    Fibromyalgia  Stable.  Pt with decreased activity this past year.  Encouraged movement       Immunosuppression due to drug therapy         Peripheral vascular disease, unspecified (CMS-HCC)  Noted on chart review.  No attributable symptoms.  Pt to be monitored by PCP         Type 2 diabetes mellitus with diabetic cataract, without long-term current use of insulin  Noted on chart review  Pt with history of diabetes now with  cataract and vision changes.  Pt to see PCP and ophtho for monitoring on a regular basis         Traumatic complete tear of right rotator cuff, subsequent encounter  Pt to schedule repair to restore mobility         Follow up: ___4__months    Immunization History   Administered Date(s) Administered   • Flu vaccine, quadrivalent, high-dose, preservative free, age 65y+ (FLUZONE) 10/25/2022   • Flu vaccine, trivalent, preservative free, HIGH-DOSE, age 65y+ (Fluzone) 11/27/2018, 10/07/2019, 12/06/2021   • Influenza Nasal, Unspecified 10/29/2010   • Influenza, Unspecified 11/18/2002, 10/21/2005, 10/06/2008, 10/29/2010, 10/24/2011   • Influenza, seasonal, injectable 11/18/2002, 10/21/2005, 10/06/2008, 10/29/2010, 10/24/2011   • Moderna COVID-19 vaccine, bivalent, blue cap/gray label *Check age/dose* 11/13/2022   • Moderna SARS-CoV-2 Vaccination 03/10/2021, 04/07/2021, 12/20/2021   • Pfizer Gray Cap SARS-CoV-2 05/26/2022   • Pneumococcal conjugate vaccine, 13-valent (PREVNAR 13) 10/07/2019   • Pneumococcal conjugate vaccine, 20-valent (PREVNAR 20) 02/23/2023   • Pneumococcal polysaccharide vaccine, 23-valent, age 2 years and older (PNEUMOVAX 23) 11/18/2002   • Td (adult), unspecified 12/07/2016   • Tdap vaccine, age 7 year and older (BOOSTRIX, ADACEL) 02/14/2009, 12/07/2016   • Zoster, live 06/30/2011           Patient was identified as a fall risk. Risk prevention instructions provided.

## 2024-11-15 NOTE — PATIENT INSTRUCTIONS
It was a pleasure to see you today  Please call if your symptoms worsen  Please review your summary for education and reminders.  Follow up at your next appointment.    If you had labs/xrays done today, you will be able to view on [employer].   We will contact you when the results are reviewed for further discussion.  Please note that you may receive your results before I have had a chance to review.  Please know I will be contacting you for discussion  Homegoing instructions for all patient  A healthy lifestyle helps chronic diseases  These are all the goals you should strive to improve your overall health   Blood pressure <130/85   BMI of <30 or waist circumference that is 1/2 of your height   Fasting blood sugar <107 (if you are diabetic, aim for an A1c <6.4%_   LDL cholesterol <130   Avoid smoking   Manage your stress   Get your preventive exams   Get your immunizations       Ways to Help Prevent Falls at Home    Quick Tips   ? Ask for help if you need it. Most people want to help!   ? Get up slowly after sitting or laying down   ? Wear a medical alert device or keep cell phone in your pocket   ? Use night lights, especially areas near a bathroom   ? Keep the items you use often within reach on a small stool or end table   ? Use an assistive device such as walker or cane, as directed by provider/physical therapy   ? Use a non-slip mat and grab bars in your bathroom. Look for home health sections for best options     Other Areas to Focus On   ? Exercise and nutrition: Regular exercise or taking a falls prevention class are great ways improve strength and balance. Don’t forget to stay hydrated and bring a snack!   ? Medicine side effects: Some medicines can make you sleepy or dizzy, which could cause a fall. Ask your healthcare provider about the side effects your medicines could cause. Be sure to let them know if you take any vitamins or supplements as well.   ? Tripping hazards: Remove items you could trip on, such  as loose mats, rugs, cords, and clutter. Wear closed toe shoes with rubber soles.   ? Health and wellness: Get regular checkups with your healthcare provider, plus routine vision and hearing screenings. Talk with your healthcare provider about:   o Your medicines and the possible side effects - bring them in a bag if that is easier!   o Problems with balance or feeling dizzy   o Ways to promote bone health, such as Vitamin D and calcium supplements   o Questions or concerns about falling     *Ask your healthcare team if you have questions     ©[facility], 2022

## 2024-11-15 NOTE — ASSESSMENT & PLAN NOTE
On sulfasalazine and otezla.  Continue meds.  Labs ordered today to monitor for increased disease activity, end organ manifestations and to monitor for any drug toxicities due to chronic medications    Orders:    CBC and Auto Differential; Future    Comprehensive Metabolic Panel; Future    C-Reactive Protein; Future    Sedimentation Rate; Future

## 2024-11-15 NOTE — ASSESSMENT & PLAN NOTE
>>ASSESSMENT AND PLAN FOR TYPE 2 DIABETES MELLITUS WITH DIABETIC CATARACT, WITHOUT LONG-TERM CURRENT USE OF INSULIN WRITTEN ON 11/15/2024 10:31 AM BY LAURA BOBBY MD    Noted on chart review  Pt with history of diabetes now with cataract and vision changes.  Pt to see PCP and ophtho for monitoring on a regular basis          ----- Message from Ana Dobson PA-C sent at 9/12/2020  1:23 PM CDT -----  pls call pt, normal potassium, continue current medication.

## 2024-12-11 ENCOUNTER — OFFICE (OUTPATIENT)
Dept: URBAN - METROPOLITAN AREA CLINIC 27 | Facility: CLINIC | Age: 74
End: 2024-12-11

## 2024-12-11 ENCOUNTER — TELEPHONE (OUTPATIENT)
Dept: RHEUMATOLOGY | Facility: CLINIC | Age: 74
End: 2024-12-11
Payer: MEDICARE

## 2024-12-11 VITALS
TEMPERATURE: 98 F | SYSTOLIC BLOOD PRESSURE: 154 MMHG | HEART RATE: 72 BPM | DIASTOLIC BLOOD PRESSURE: 83 MMHG | HEIGHT: 64 IN | DIASTOLIC BLOOD PRESSURE: 79 MMHG | SYSTOLIC BLOOD PRESSURE: 176 MMHG | WEIGHT: 205 LBS

## 2024-12-11 DIAGNOSIS — K21.9 GASTRO-ESOPHAGEAL REFLUX DISEASE WITHOUT ESOPHAGITIS: ICD-10-CM

## 2024-12-11 DIAGNOSIS — R19.7 DIARRHEA, UNSPECIFIED: ICD-10-CM

## 2024-12-11 DIAGNOSIS — K76.0 FATTY (CHANGE OF) LIVER, NOT ELSEWHERE CLASSIFIED: ICD-10-CM

## 2024-12-11 PROCEDURE — 99214 OFFICE O/P EST MOD 30 MIN: CPT | Performed by: INTERNAL MEDICINE

## 2024-12-11 NOTE — TELEPHONE ENCOUNTER
I called patient and left a message on patient's voicemail to call our office regarding results message.

## 2024-12-11 NOTE — TELEPHONE ENCOUNTER
----- Message from Huma Ba sent at 12/11/2024  2:59 PM EST -----  Let pt know that  I received her labs today from the Clinic.   Just has a borderline elevation in inflammation but otherwise all labs are normal.   Continue on medications

## 2024-12-12 NOTE — TELEPHONE ENCOUNTER
Patient wants to know which surgeon she should see.  She has seen Dr. Arndt from [facility] and Dr. Kenyon from the [facility]

## 2025-02-05 ENCOUNTER — TELEPHONE (OUTPATIENT)
Dept: RHEUMATOLOGY | Facility: CLINIC | Age: 75
End: 2025-02-05
Payer: MEDICARE

## 2025-02-05 NOTE — TELEPHONE ENCOUNTER
Patient has psoriasis real bad on her feet.  She has been to Dermatology and to her pcp and nothing they have suggested for her has worked.  It itches really bad.  She was hoping she would be able to see you. No appointments are available.  She couldn't say what has been tried and didn't work.

## 2025-05-14 ENCOUNTER — OFFICE (OUTPATIENT)
Dept: URBAN - METROPOLITAN AREA CLINIC 27 | Facility: CLINIC | Age: 75
End: 2025-05-14
Payer: MEDICARE

## 2025-05-14 ENCOUNTER — APPOINTMENT (OUTPATIENT)
Dept: RHEUMATOLOGY | Facility: CLINIC | Age: 75
End: 2025-05-14
Payer: MEDICARE

## 2025-05-14 VITALS — WEIGHT: 216 LBS | HEIGHT: 64 IN

## 2025-05-14 VITALS
SYSTOLIC BLOOD PRESSURE: 150 MMHG | HEIGHT: 64 IN | OXYGEN SATURATION: 96 % | WEIGHT: 215 LBS | HEART RATE: 71 BPM | DIASTOLIC BLOOD PRESSURE: 73 MMHG | TEMPERATURE: 98.2 F | BODY MASS INDEX: 36.7 KG/M2

## 2025-05-14 DIAGNOSIS — D84.821 IMMUNOSUPPRESSION DUE TO DRUG THERAPY: ICD-10-CM

## 2025-05-14 DIAGNOSIS — K21.9 GASTRO-ESOPHAGEAL REFLUX DISEASE WITHOUT ESOPHAGITIS: ICD-10-CM

## 2025-05-14 DIAGNOSIS — Z80.0 FAMILY HISTORY OF MALIGNANT NEOPLASM OF DIGESTIVE ORGANS: ICD-10-CM

## 2025-05-14 DIAGNOSIS — Z79.899 IMMUNOSUPPRESSION DUE TO DRUG THERAPY: ICD-10-CM

## 2025-05-14 DIAGNOSIS — M79.7 FIBROMYALGIA: ICD-10-CM

## 2025-05-14 DIAGNOSIS — M17.11 PRIMARY OSTEOARTHRITIS OF RIGHT KNEE: ICD-10-CM

## 2025-05-14 DIAGNOSIS — L40.59 POLYARTICULAR PSORIATIC ARTHRITIS (MULTI): Primary | ICD-10-CM

## 2025-05-14 DIAGNOSIS — H53.9 VISION CHANGES: ICD-10-CM

## 2025-05-14 DIAGNOSIS — K76.0 FATTY (CHANGE OF) LIVER, NOT ELSEWHERE CLASSIFIED: ICD-10-CM

## 2025-05-14 PROCEDURE — 99213 OFFICE O/P EST LOW 20 MIN: CPT | Performed by: INTERNAL MEDICINE

## 2025-05-14 PROCEDURE — 4010F ACE/ARB THERAPY RXD/TAKEN: CPT | Performed by: INTERNAL MEDICINE

## 2025-05-14 PROCEDURE — 1036F TOBACCO NON-USER: CPT | Performed by: INTERNAL MEDICINE

## 2025-05-14 PROCEDURE — 1159F MED LIST DOCD IN RCRD: CPT | Performed by: INTERNAL MEDICINE

## 2025-05-14 PROCEDURE — 3008F BODY MASS INDEX DOCD: CPT | Performed by: INTERNAL MEDICINE

## 2025-05-14 PROCEDURE — 99214 OFFICE O/P EST MOD 30 MIN: CPT | Performed by: INTERNAL MEDICINE

## 2025-05-14 PROCEDURE — 1160F RVW MEDS BY RX/DR IN RCRD: CPT | Performed by: INTERNAL MEDICINE

## 2025-05-14 PROCEDURE — 3077F SYST BP >= 140 MM HG: CPT | Performed by: INTERNAL MEDICINE

## 2025-05-14 PROCEDURE — 3078F DIAST BP <80 MM HG: CPT | Performed by: INTERNAL MEDICINE

## 2025-05-14 PROCEDURE — 1124F ACP DISCUSS-NO DSCNMKR DOCD: CPT | Performed by: INTERNAL MEDICINE

## 2025-05-14 RX ORDER — LOSARTAN POTASSIUM 100 MG/1
50 TABLET ORAL
Qty: 45 TABLET | Refills: 0 | Status: SHIPPED | OUTPATIENT
Start: 2025-05-14

## 2025-05-14 RX ORDER — PANTOPRAZOLE SODIUM 40 MG/1
TABLET, DELAYED RELEASE ORAL
COMMUNITY
Start: 2024-10-08 | End: 2025-05-14 | Stop reason: WASHOUT

## 2025-05-14 RX ORDER — CHOLESTYRAMINE 4 G/9G
POWDER, FOR SUSPENSION ORAL
COMMUNITY
Start: 2025-02-01

## 2025-05-14 RX ORDER — LEVOCETIRIZINE DIHYDROCHLORIDE 5 MG/1
5 TABLET, FILM COATED ORAL NIGHTLY
COMMUNITY
Start: 2025-04-29

## 2025-05-14 RX ORDER — BACLOFEN 10 MG/1
10 TABLET ORAL 3 TIMES DAILY
Qty: 270 TABLET | Refills: 0 | Status: SHIPPED | OUTPATIENT
Start: 2025-05-14

## 2025-05-14 RX ORDER — FUROSEMIDE 20 MG/1
1 TABLET ORAL
COMMUNITY
Start: 2025-04-29 | End: 2025-05-14 | Stop reason: WASHOUT

## 2025-05-14 RX ORDER — SULFASALAZINE 500 MG/1
500 TABLET, DELAYED RELEASE ORAL 2 TIMES DAILY
Qty: 60 TABLET | Refills: 11 | Status: SHIPPED | OUTPATIENT
Start: 2025-05-14 | End: 2026-05-14

## 2025-05-14 RX ORDER — LOSARTAN POTASSIUM 100 MG/1
1 TABLET ORAL
COMMUNITY
Start: 2025-02-01 | End: 2025-05-14 | Stop reason: DRUGHIGH

## 2025-05-14 ASSESSMENT — ENCOUNTER SYMPTOMS
WEAKNESS: 0
SHORTNESS OF BREATH: 0
GASTROINTESTINAL NEGATIVE: 1
SLEEP DISTURBANCE: 1
FATIGUE: 1
EYES NEGATIVE: 1
COUGH: 0
COLOR CHANGE: 0
ARTHRALGIAS: 1
MYALGIAS: 0
BACK PAIN: 0
FEVER: 0
JOINT SWELLING: 1
ENDOCRINE NEGATIVE: 1
PALPITATIONS: 0
NUMBNESS: 0

## 2025-05-14 ASSESSMENT — PATIENT HEALTH QUESTIONNAIRE - PHQ9
SUM OF ALL RESPONSES TO PHQ9 QUESTIONS 1 AND 2: 0
2. FEELING DOWN, DEPRESSED OR HOPELESS: NOT AT ALL
1. LITTLE INTEREST OR PLEASURE IN DOING THINGS: NOT AT ALL

## 2025-05-14 NOTE — PATIENT INSTRUCTIONS
It was a pleasure to see you today  Please call if your symptoms worsen  Please review your summary for education and reminders.  Follow up at your next appointment.    If you had labs/xrays done today, you will be able to view on [employer].   We will contact you when the results are reviewed for further discussion.  Please note that you may receive your results before I have had a chance to review.  Please know I will be contacting you for discussion  Homegoing instructions for all patient  A healthy lifestyle helps chronic diseases  These are all the goals you should strive to improve your overall health   Blood pressure <130/85   BMI of <30 or waist circumference that is 1/2 of your height   Fasting blood sugar <107 (if you are diabetic, aim for an A1c <6.4%_   LDL cholesterol <130   Avoid smoking   Manage your stress   Get your preventive exams   Get your immunizations   Common Emergency Awareness Tips   IS IT A STROKE?   Act FAST and Check for these signs:   FACE Does the face look uneven?   ARM Does one arm drift down?   SPEECH Does their speech sound strange?   TIME Call 9-1-1 at any sign of stroke     Heart Attack Signs   Chest discomfort: Most heart attacks involve discomfort in the center of the chest and lasts more than a few minutes, or goes away and comes back. It can feel like uncomfortable pressure, squeezing, fullness or pain.   Discomfort in upper body: Symptoms can include pain or discomfort in one or both arms, back, neck, jaw or stomach.   Shortness of breath: With or without discomfort.   Other signs: Breaking out in a cold sweat, nausea, or lightheaded.   Remember, MINUTES DO MATTER. If you experience any of these heart attack warning signs, call 9-1-1 to get immediate medical attention!            Ways to Help Prevent Falls at Home    Quick Tips   ? Ask for help if you need it. Most people want to help!   ? Get up slowly after sitting or laying down   ? Wear a medical alert device or keep cell  phone in your pocket   ? Use night lights, especially areas near a bathroom   ? Keep the items you use often within reach on a small stool or end table   ? Use an assistive device such as walker or cane, as directed by provider/physical therapy   ? Use a non-slip mat and grab bars in your bathroom. Look for home health sections for best options     Other Areas to Focus On   ? Exercise and nutrition: Regular exercise or taking a falls prevention class are great ways improve strength and balance. Don’t forget to stay hydrated and bring a snack!   ? Medicine side effects: Some medicines can make you sleepy or dizzy, which could cause a fall. Ask your healthcare provider about the side effects your medicines could cause. Be sure to let them know if you take any vitamins or supplements as well.   ? Tripping hazards: Remove items you could trip on, such as loose mats, rugs, cords, and clutter. Wear closed toe shoes with rubber soles.   ? Health and wellness: Get regular checkups with your healthcare provider, plus routine vision and hearing screenings. Talk with your healthcare provider about:   o Your medicines and the possible side effects - bring them in a bag if that is easier!   o Problems with balance or feeling dizzy   o Ways to promote bone health, such as Vitamin D and calcium supplements   o Questions or concerns about falling     *Ask your healthcare team if you have questions     ©[facility], 2022

## 2025-05-14 NOTE — ASSESSMENT & PLAN NOTE
Nonadherence to sulfasalazine so no increased risk  Orders:    Follow Up In Rheumatology; Future
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
## 2025-05-14 NOTE — ASSESSMENT & PLAN NOTE
Advised again to restart sulfasalazine along with otezla.   Lack of sleep also contributing to symptoms  Labs ordered today to monitor for increased disease activity, end organ manifestations and to monitor for any drug toxicities due to chronic medications      Orders:    CBC and Auto Differential; Future    Comprehensive Metabolic Panel; Future    C-Reactive Protein; Future    Sedimentation Rate; Future    Follow Up In Rheumatology; Future    sulfaSALAzine (Azulfidine) 500 mg DR tablet; Take 1 tablet (500 mg) by mouth 2 times a day. Do not crush, chew, or split.

## 2025-05-15 LAB
ALBUMIN SERPL-MCNC: 3.8 G/DL (ref 3.6–5.1)
ALP SERPL-CCNC: 86 U/L (ref 37–153)
ALT SERPL-CCNC: 25 U/L (ref 6–29)
ANION GAP SERPL CALCULATED.4IONS-SCNC: 9 MMOL/L (CALC) (ref 7–17)
AST SERPL-CCNC: 19 U/L (ref 10–35)
BASOPHILS # BLD AUTO: 62 CELLS/UL (ref 0–200)
BASOPHILS NFR BLD AUTO: 0.7 %
BILIRUB SERPL-MCNC: 0.3 MG/DL (ref 0.2–1.2)
BUN SERPL-MCNC: 13 MG/DL (ref 7–25)
CALCIUM SERPL-MCNC: 9.2 MG/DL (ref 8.6–10.4)
CHLORIDE SERPL-SCNC: 108 MMOL/L (ref 98–110)
CO2 SERPL-SCNC: 27 MMOL/L (ref 20–32)
CREAT SERPL-MCNC: 0.68 MG/DL (ref 0.6–1)
CRP SERPL-MCNC: 19.3 MG/L
EGFRCR SERPLBLD CKD-EPI 2021: 92 ML/MIN/1.73M2
EOSINOPHIL # BLD AUTO: 151 CELLS/UL (ref 15–500)
EOSINOPHIL NFR BLD AUTO: 1.7 %
ERYTHROCYTE [DISTWIDTH] IN BLOOD BY AUTOMATED COUNT: 14.6 % (ref 11–15)
ERYTHROCYTE [SEDIMENTATION RATE] IN BLOOD BY WESTERGREN METHOD: 25 MM/H
GLUCOSE SERPL-MCNC: 143 MG/DL (ref 65–139)
HCT VFR BLD AUTO: 37.5 % (ref 35–45)
HGB BLD-MCNC: 11.9 G/DL (ref 11.7–15.5)
LYMPHOCYTES # BLD AUTO: 2483 CELLS/UL (ref 850–3900)
LYMPHOCYTES NFR BLD AUTO: 27.9 %
MCH RBC QN AUTO: 29.3 PG (ref 27–33)
MCHC RBC AUTO-ENTMCNC: 31.7 G/DL (ref 32–36)
MCV RBC AUTO: 92.4 FL (ref 80–100)
MONOCYTES # BLD AUTO: 668 CELLS/UL (ref 200–950)
MONOCYTES NFR BLD AUTO: 7.5 %
NEUTROPHILS # BLD AUTO: 5536 CELLS/UL (ref 1500–7800)
NEUTROPHILS NFR BLD AUTO: 62.2 %
PLATELET # BLD AUTO: 273 THOUSAND/UL (ref 140–400)
PMV BLD REES-ECKER: 10.3 FL (ref 7.5–12.5)
POTASSIUM SERPL-SCNC: 4 MMOL/L (ref 3.5–5.3)
PROT SERPL-MCNC: 6.1 G/DL (ref 6.1–8.1)
RBC # BLD AUTO: 4.06 MILLION/UL (ref 3.8–5.1)
SODIUM SERPL-SCNC: 144 MMOL/L (ref 135–146)
WBC # BLD AUTO: 8.9 THOUSAND/UL (ref 3.8–10.8)

## 2025-05-23 ENCOUNTER — TELEPHONE (OUTPATIENT)
Dept: RHEUMATOLOGY | Facility: CLINIC | Age: 75
End: 2025-05-23
Payer: MEDICARE

## 2025-05-23 NOTE — TELEPHONE ENCOUNTER
Pt calling, states that on her labs ordered by you on 5/14/25 - it showed low MCHC level (31.7) - NL range is 32.0-36.0.  She is asking if that is of any concern?    Also, she has a c/o Fatigue and is wondering if her blood count level is low.  She notes that she has been taking her Sulfasalazine as prescribed, but still has notable pain in her wrist, feet, and ankles.    She wants to know if there is anything more she can do for the pain?    Please advise.

## 2025-11-14 ENCOUNTER — APPOINTMENT (OUTPATIENT)
Dept: RHEUMATOLOGY | Facility: CLINIC | Age: 75
End: 2025-11-14
Payer: MEDICARE